# Patient Record
Sex: MALE | Race: WHITE | NOT HISPANIC OR LATINO
[De-identification: names, ages, dates, MRNs, and addresses within clinical notes are randomized per-mention and may not be internally consistent; named-entity substitution may affect disease eponyms.]

---

## 2017-01-10 ENCOUNTER — APPOINTMENT (OUTPATIENT)
Dept: HEART AND VASCULAR | Facility: CLINIC | Age: 67
End: 2017-01-10

## 2017-01-10 ENCOUNTER — LABORATORY RESULT (OUTPATIENT)
Age: 67
End: 2017-01-10

## 2017-01-10 VITALS — SYSTOLIC BLOOD PRESSURE: 150 MMHG | DIASTOLIC BLOOD PRESSURE: 92 MMHG

## 2017-01-10 VITALS
WEIGHT: 183 LBS | BODY MASS INDEX: 26.2 KG/M2 | HEART RATE: 73 BPM | SYSTOLIC BLOOD PRESSURE: 176 MMHG | DIASTOLIC BLOOD PRESSURE: 78 MMHG | HEIGHT: 70 IN

## 2017-01-10 VITALS — SYSTOLIC BLOOD PRESSURE: 162 MMHG | DIASTOLIC BLOOD PRESSURE: 78 MMHG

## 2017-01-11 LAB
ANION GAP SERPL CALC-SCNC: 18 MMOL/L
APPEARANCE: CLEAR
BACTERIA: NEGATIVE
BILIRUBIN URINE: NEGATIVE
BLOOD URINE: NEGATIVE
BUN SERPL-MCNC: 21 MG/DL
CALCIUM SERPL-MCNC: 9.7 MG/DL
CHLORIDE SERPL-SCNC: 102 MMOL/L
CHOLEST SERPL-MCNC: 217 MG/DL
CHOLEST/HDLC SERPL: 5.6 RATIO
CO2 SERPL-SCNC: 23 MMOL/L
COLOR: YELLOW
CREAT SERPL-MCNC: 1.2 MG/DL
FOLATE SERPL-MCNC: 17.4 NG/ML
GLUCOSE QUALITATIVE U: NORMAL MG/DL
GLUCOSE SERPL-MCNC: 105 MG/DL
HDLC SERPL-MCNC: 39 MG/DL
KETONES URINE: NEGATIVE
LDLC SERPL CALC-MCNC: 154 MG/DL
LEUKOCYTE ESTERASE URINE: NEGATIVE
LPL SERPL-CCNC: 43 U/L
MICROSCOPIC-UA: NORMAL
NITRITE URINE: NEGATIVE
PH URINE: 5.5
POTASSIUM SERPL-SCNC: 4.4 MMOL/L
PROTEIN URINE: NEGATIVE MG/DL
RED BLOOD CELLS URINE: 2 /HPF
SODIUM SERPL-SCNC: 143 MMOL/L
SPECIFIC GRAVITY URINE: 1.01
SQUAMOUS EPITHELIAL CELLS: 0 /HPF
TRIGL SERPL-MCNC: 120 MG/DL
URATE SERPL-MCNC: 5.8 MG/DL
UROBILINOGEN URINE: NORMAL MG/DL
VIT B12 SERPL-MCNC: 910 PG/ML
WHITE BLOOD CELLS URINE: 0 /HPF

## 2017-01-12 ENCOUNTER — FORM ENCOUNTER (OUTPATIENT)
Age: 67
End: 2017-01-12

## 2017-01-12 ENCOUNTER — OTHER (OUTPATIENT)
Age: 67
End: 2017-01-12

## 2017-01-13 ENCOUNTER — OUTPATIENT (OUTPATIENT)
Dept: OUTPATIENT SERVICES | Facility: HOSPITAL | Age: 67
LOS: 1 days | End: 2017-01-13
Payer: MEDICARE

## 2017-01-13 PROCEDURE — 93976 VASCULAR STUDY: CPT

## 2017-01-13 PROCEDURE — 93976 VASCULAR STUDY: CPT | Mod: 26

## 2017-01-13 PROCEDURE — 72070 X-RAY EXAM THORAC SPINE 2VWS: CPT

## 2017-01-13 PROCEDURE — 72100 X-RAY EXAM L-S SPINE 2/3 VWS: CPT | Mod: 26,77

## 2017-01-13 PROCEDURE — 76770 US EXAM ABDO BACK WALL COMP: CPT | Mod: 26,59

## 2017-01-13 PROCEDURE — 72100 X-RAY EXAM L-S SPINE 2/3 VWS: CPT

## 2017-01-13 PROCEDURE — 72100 X-RAY EXAM L-S SPINE 2/3 VWS: CPT | Mod: 26

## 2017-01-13 PROCEDURE — 76770 US EXAM ABDO BACK WALL COMP: CPT

## 2017-01-31 ENCOUNTER — APPOINTMENT (OUTPATIENT)
Dept: ORTHOPEDIC SURGERY | Facility: CLINIC | Age: 67
End: 2017-01-31

## 2017-01-31 VITALS
WEIGHT: 182 LBS | BODY MASS INDEX: 26.05 KG/M2 | HEIGHT: 70 IN | DIASTOLIC BLOOD PRESSURE: 88 MMHG | SYSTOLIC BLOOD PRESSURE: 140 MMHG

## 2017-01-31 DIAGNOSIS — Z82.49 FAMILY HISTORY OF ISCHEMIC HEART DISEASE AND OTHER DISEASES OF THE CIRCULATORY SYSTEM: ICD-10-CM

## 2017-01-31 DIAGNOSIS — Z78.9 OTHER SPECIFIED HEALTH STATUS: ICD-10-CM

## 2017-01-31 DIAGNOSIS — Z82.61 FAMILY HISTORY OF ARTHRITIS: ICD-10-CM

## 2017-01-31 DIAGNOSIS — Z82.2 FAMILY HISTORY OF DEAFNESS AND HEARING LOSS: ICD-10-CM

## 2017-05-09 ENCOUNTER — APPOINTMENT (OUTPATIENT)
Dept: HEART AND VASCULAR | Facility: CLINIC | Age: 67
End: 2017-05-09

## 2017-05-09 ENCOUNTER — LABORATORY RESULT (OUTPATIENT)
Age: 67
End: 2017-05-09

## 2017-05-09 VITALS — SYSTOLIC BLOOD PRESSURE: 140 MMHG | DIASTOLIC BLOOD PRESSURE: 80 MMHG | HEART RATE: 73 BPM

## 2017-05-09 VITALS — SYSTOLIC BLOOD PRESSURE: 134 MMHG | DIASTOLIC BLOOD PRESSURE: 74 MMHG

## 2017-05-09 VITALS — HEIGHT: 70 IN | WEIGHT: 177 LBS | BODY MASS INDEX: 25.34 KG/M2

## 2017-05-09 DIAGNOSIS — N42.9 DISORDER OF PROSTATE, UNSPECIFIED: ICD-10-CM

## 2017-05-10 LAB
ALBUMIN SERPL ELPH-MCNC: 4.7 G/DL
ALP BLD-CCNC: 67 U/L
ALT SERPL-CCNC: 24 U/L
ANION GAP SERPL CALC-SCNC: 17 MMOL/L
AST SERPL-CCNC: 24 U/L
BASOPHILS # BLD AUTO: 0.02 K/UL
BASOPHILS NFR BLD AUTO: 0.2 %
BILIRUB SERPL-MCNC: 0.6 MG/DL
BUN SERPL-MCNC: 15 MG/DL
CALCIUM SERPL-MCNC: 9.9 MG/DL
CHLORIDE SERPL-SCNC: 104 MMOL/L
CHOLEST SERPL-MCNC: 150 MG/DL
CHOLEST/HDLC SERPL: 4 RATIO
CO2 SERPL-SCNC: 23 MMOL/L
CREAT SERPL-MCNC: 1.14 MG/DL
EOSINOPHIL # BLD AUTO: 0.04 K/UL
EOSINOPHIL NFR BLD AUTO: 0.5 %
GLUCOSE SERPL-MCNC: 98 MG/DL
HBA1C MFR BLD HPLC: 5.4 %
HCT VFR BLD CALC: 45 %
HDLC SERPL-MCNC: 39 MG/DL
HGB BLD-MCNC: 15.4 G/DL
IMM GRANULOCYTES NFR BLD AUTO: 0.1 %
LDLC SERPL CALC-MCNC: 89 MG/DL
LYMPHOCYTES # BLD AUTO: 1.5 K/UL
LYMPHOCYTES NFR BLD AUTO: 18.5 %
MAN DIFF?: NORMAL
MCHC RBC-ENTMCNC: 31.8 PG
MCHC RBC-ENTMCNC: 34.2 GM/DL
MCV RBC AUTO: 93 FL
MONOCYTES # BLD AUTO: 0.42 K/UL
MONOCYTES NFR BLD AUTO: 5.2 %
NEUTROPHILS # BLD AUTO: 6.11 K/UL
NEUTROPHILS NFR BLD AUTO: 75.5 %
PLATELET # BLD AUTO: 197 K/UL
POTASSIUM SERPL-SCNC: 4.9 MMOL/L
PROT SERPL-MCNC: 7.4 G/DL
RBC # BLD: 4.84 M/UL
RBC # FLD: 13.6 %
SODIUM SERPL-SCNC: 144 MMOL/L
T3FREE SERPL-MCNC: 2.94 PG/ML
T4 FREE SERPL-MCNC: 1.2 NG/DL
TRIGL SERPL-MCNC: 108 MG/DL
TSH SERPL-ACNC: 4.04 UIU/ML
WBC # FLD AUTO: 8.1 K/UL

## 2017-06-10 ENCOUNTER — EMERGENCY (EMERGENCY)
Facility: HOSPITAL | Age: 67
LOS: 1 days | Discharge: HOME | End: 2017-06-10
Attending: EMERGENCY MEDICINE | Admitting: EMERGENCY MEDICINE
Payer: MEDICARE

## 2017-06-10 VITALS
SYSTOLIC BLOOD PRESSURE: 134 MMHG | TEMPERATURE: 98 F | RESPIRATION RATE: 18 BRPM | OXYGEN SATURATION: 99 % | DIASTOLIC BLOOD PRESSURE: 75 MMHG | HEART RATE: 60 BPM

## 2017-06-10 VITALS
OXYGEN SATURATION: 100 % | TEMPERATURE: 98 F | WEIGHT: 177.03 LBS | HEART RATE: 76 BPM | SYSTOLIC BLOOD PRESSURE: 154 MMHG | RESPIRATION RATE: 17 BRPM | DIASTOLIC BLOOD PRESSURE: 88 MMHG

## 2017-06-10 DIAGNOSIS — Z79.899 OTHER LONG TERM (CURRENT) DRUG THERAPY: ICD-10-CM

## 2017-06-10 DIAGNOSIS — E78.00 PURE HYPERCHOLESTEROLEMIA, UNSPECIFIED: ICD-10-CM

## 2017-06-10 DIAGNOSIS — I10 ESSENTIAL (PRIMARY) HYPERTENSION: ICD-10-CM

## 2017-06-10 DIAGNOSIS — R07.89 OTHER CHEST PAIN: ICD-10-CM

## 2017-06-10 DIAGNOSIS — E03.9 HYPOTHYROIDISM, UNSPECIFIED: ICD-10-CM

## 2017-06-10 LAB
ALBUMIN SERPL ELPH-MCNC: 4.3 G/DL — SIGNIFICANT CHANGE UP (ref 3.3–5)
ALP SERPL-CCNC: 56 U/L — SIGNIFICANT CHANGE UP (ref 40–120)
ALT FLD-CCNC: 22 U/L — SIGNIFICANT CHANGE UP (ref 10–45)
ANION GAP SERPL CALC-SCNC: 12 MMOL/L — SIGNIFICANT CHANGE UP (ref 5–17)
AST SERPL-CCNC: 34 U/L — SIGNIFICANT CHANGE UP (ref 10–40)
BASOPHILS NFR BLD AUTO: 0.2 % — SIGNIFICANT CHANGE UP (ref 0–2)
BILIRUB SERPL-MCNC: 0.5 MG/DL — SIGNIFICANT CHANGE UP (ref 0.2–1.2)
BUN SERPL-MCNC: 23 MG/DL — SIGNIFICANT CHANGE UP (ref 7–23)
CALCIUM SERPL-MCNC: 9.3 MG/DL — SIGNIFICANT CHANGE UP (ref 8.4–10.5)
CHLORIDE SERPL-SCNC: 104 MMOL/L — SIGNIFICANT CHANGE UP (ref 96–108)
CK MB CFR SERPL CALC: 2.1 NG/ML — SIGNIFICANT CHANGE UP (ref 0–6.7)
CK SERPL-CCNC: 55 U/L — SIGNIFICANT CHANGE UP (ref 30–200)
CO2 SERPL-SCNC: 24 MMOL/L — SIGNIFICANT CHANGE UP (ref 22–31)
CREAT SERPL-MCNC: 1 MG/DL — SIGNIFICANT CHANGE UP (ref 0.5–1.3)
EOSINOPHIL NFR BLD AUTO: 0.7 % — SIGNIFICANT CHANGE UP (ref 0–6)
GLUCOSE SERPL-MCNC: 113 MG/DL — HIGH (ref 70–99)
HCT VFR BLD CALC: 42.8 % — SIGNIFICANT CHANGE UP (ref 39–50)
HGB BLD-MCNC: 15.1 G/DL — SIGNIFICANT CHANGE UP (ref 13–17)
LYMPHOCYTES # BLD AUTO: 28.5 % — SIGNIFICANT CHANGE UP (ref 13–44)
MCHC RBC-ENTMCNC: 30.9 PG — SIGNIFICANT CHANGE UP (ref 27–34)
MCHC RBC-ENTMCNC: 35.3 G/DL — SIGNIFICANT CHANGE UP (ref 32–36)
MCV RBC AUTO: 87.7 FL — SIGNIFICANT CHANGE UP (ref 80–100)
MONOCYTES NFR BLD AUTO: 8.1 % — SIGNIFICANT CHANGE UP (ref 2–14)
NEUTROPHILS NFR BLD AUTO: 62.5 % — SIGNIFICANT CHANGE UP (ref 43–77)
NT-PROBNP SERPL-SCNC: 7 PG/ML — SIGNIFICANT CHANGE UP (ref 0–300)
PLATELET # BLD AUTO: 161 K/UL — SIGNIFICANT CHANGE UP (ref 150–400)
POTASSIUM SERPL-MCNC: 5 MMOL/L — SIGNIFICANT CHANGE UP (ref 3.5–5.3)
POTASSIUM SERPL-SCNC: 5 MMOL/L — SIGNIFICANT CHANGE UP (ref 3.5–5.3)
PROT SERPL-MCNC: 7.5 G/DL — SIGNIFICANT CHANGE UP (ref 6–8.3)
RBC # BLD: 4.88 M/UL — SIGNIFICANT CHANGE UP (ref 4.2–5.8)
RBC # FLD: 12.8 % — SIGNIFICANT CHANGE UP (ref 10.3–16.9)
SODIUM SERPL-SCNC: 140 MMOL/L — SIGNIFICANT CHANGE UP (ref 135–145)
TROPONIN T SERPL-MCNC: <0.01 NG/ML — SIGNIFICANT CHANGE UP (ref 0–0.01)
TROPONIN T SERPL-MCNC: <0.01 NG/ML — SIGNIFICANT CHANGE UP (ref 0–0.01)
WBC # BLD: 8.9 K/UL — SIGNIFICANT CHANGE UP (ref 3.8–10.5)
WBC # FLD AUTO: 8.9 K/UL — SIGNIFICANT CHANGE UP (ref 3.8–10.5)

## 2017-06-10 PROCEDURE — 99285 EMERGENCY DEPT VISIT HI MDM: CPT | Mod: 25

## 2017-06-10 PROCEDURE — 80053 COMPREHEN METABOLIC PANEL: CPT

## 2017-06-10 PROCEDURE — 93010 ELECTROCARDIOGRAM REPORT: CPT

## 2017-06-10 PROCEDURE — 82553 CREATINE MB FRACTION: CPT

## 2017-06-10 PROCEDURE — 99284 EMERGENCY DEPT VISIT MOD MDM: CPT | Mod: 25

## 2017-06-10 PROCEDURE — 85610 PROTHROMBIN TIME: CPT

## 2017-06-10 PROCEDURE — 85730 THROMBOPLASTIN TIME PARTIAL: CPT

## 2017-06-10 PROCEDURE — 71010: CPT | Mod: 26

## 2017-06-10 PROCEDURE — 93005 ELECTROCARDIOGRAM TRACING: CPT

## 2017-06-10 PROCEDURE — 84484 ASSAY OF TROPONIN QUANT: CPT

## 2017-06-10 PROCEDURE — 36415 COLL VENOUS BLD VENIPUNCTURE: CPT

## 2017-06-10 PROCEDURE — 85025 COMPLETE CBC W/AUTO DIFF WBC: CPT

## 2017-06-10 PROCEDURE — 71045 X-RAY EXAM CHEST 1 VIEW: CPT

## 2017-06-10 PROCEDURE — 96374 THER/PROPH/DIAG INJ IV PUSH: CPT

## 2017-06-10 PROCEDURE — 83880 ASSAY OF NATRIURETIC PEPTIDE: CPT

## 2017-06-10 PROCEDURE — 82550 ASSAY OF CK (CPK): CPT

## 2017-06-10 RX ORDER — FAMOTIDINE 10 MG/ML
20 INJECTION INTRAVENOUS ONCE
Qty: 0 | Refills: 0 | Status: COMPLETED | OUTPATIENT
Start: 2017-06-10 | End: 2017-06-10

## 2017-06-10 RX ADMIN — FAMOTIDINE 20 MILLIGRAM(S): 10 INJECTION INTRAVENOUS at 10:53

## 2017-06-10 NOTE — ED PROVIDER NOTE - MEDICAL DECISION MAKING DETAILS
pt with cp, hx of htn and high cholesterol normal stress one month prior, ekg and trop x 2 neg, hx of reflux, symptoms relieved by pepcid most likely GI cause of pain but pt encouraged to follow up with Dr. Calix on Monday.  Attempted to call but unable to contact.

## 2017-06-10 NOTE — ED ADULT TRIAGE NOTE - CHIEF COMPLAINT QUOTE
constant midsternal, nonradiating pressure like chest pain that started 45 mins ago [place].  Sent in by Dr. Calix.  Hx: HTN, HLD

## 2017-06-10 NOTE — ED PROVIDER NOTE - OBJECTIVE STATEMENT
68 y/o m with PMH of HTN, gout, hypothyroid, HLD, GERD presents to ED with midsternal chest discomfort starting while walking quickly.  Pain started at 9:30 lasted for 45 minutes and was concerned although symptoms felt similar to GERD.  Denies radiation of pain or other associated symptoms such as dyspnea, nausea, diaphoresis.  Cards: Laurence Calix - had normal stress test and echo 5/17.

## 2017-06-10 NOTE — ED ADULT NURSE NOTE - CHPI ED SYMPTOMS NEG
no shortness of breath/no cough/no diaphoresis/no dizziness/HA, visual changes, numbness or tingling to extremities, diarrhea, productive cough, leg swelling, hx of recent travel./no vomiting/no nausea/no syncope/no fever/no back pain/no chills

## 2017-06-10 NOTE — ED ADULT NURSE REASSESSMENT NOTE - NS ED NURSE REASSESS COMMENT FT1
Patient is A&O x3 and +PERRLA. Patient is NAD, speaking full sentences, chest rise is equal and symmetrical bilaterally, trachea midline and lung sounds are clear in upper/lower bases bilaterally. Abdomen is SNT upon palpation, +bowel sounds in all 4 quadrants, no pulsatile mass noted. No edema noted in lower extremities bilaterally, cap refill less than 3 seconds bilaterally. Patient resting comfortably and aware with plan of care, will continue to monitor.
Lab and x-ray results reviewed, patient awaiting 2nd lab draw for cardiac enzymes. Patient aware with plan of care, will continue to monitor.
Repeat lab results reviewed, patient awaiting discharge and aware with plan of care, will continue to monitor.

## 2017-06-10 NOTE — ED ADULT NURSE NOTE - OBJECTIVE STATEMENT
Patient comes in ambulatory into bed # 6 complaining of constant burning/sharp 4/10, non radiating mid sternal chest pain that started around 09:30AM today [place] the train. Patient reports has had similar symptoms but was reflux in the past. Patient has not been taking reflux medications and no prior tx to arrival.

## 2017-09-25 ENCOUNTER — APPOINTMENT (OUTPATIENT)
Dept: HEART AND VASCULAR | Facility: CLINIC | Age: 67
End: 2017-09-25
Payer: MEDICARE

## 2017-09-25 VITALS
HEART RATE: 64 BPM | HEIGHT: 70 IN | SYSTOLIC BLOOD PRESSURE: 160 MMHG | WEIGHT: 177 LBS | DIASTOLIC BLOOD PRESSURE: 74 MMHG | BODY MASS INDEX: 25.34 KG/M2

## 2017-09-25 VITALS — DIASTOLIC BLOOD PRESSURE: 74 MMHG | SYSTOLIC BLOOD PRESSURE: 142 MMHG

## 2017-09-25 VITALS — TEMPERATURE: 98.3 F

## 2017-09-25 PROCEDURE — 36415 COLL VENOUS BLD VENIPUNCTURE: CPT

## 2017-09-25 PROCEDURE — 93000 ELECTROCARDIOGRAM COMPLETE: CPT

## 2017-09-25 PROCEDURE — 99214 OFFICE O/P EST MOD 30 MIN: CPT | Mod: 25

## 2017-09-27 LAB
ALBUMIN SERPL ELPH-MCNC: 4.6 G/DL
ALP BLD-CCNC: 61 U/L
ALT SERPL-CCNC: 24 U/L
ANION GAP SERPL CALC-SCNC: 18 MMOL/L
APPEARANCE: CLEAR
APPEARANCE: CLEAR
AST SERPL-CCNC: 19 U/L
BACTERIA: NEGATIVE
BASOPHILS # BLD AUTO: 0.02 K/UL
BASOPHILS NFR BLD AUTO: 0.3 %
BILIRUB SERPL-MCNC: 0.8 MG/DL
BILIRUBIN URINE: NEGATIVE
BILIRUBIN URINE: NEGATIVE
BLOOD URINE: NEGATIVE
BLOOD URINE: NEGATIVE
BUN SERPL-MCNC: 17 MG/DL
CALCIUM SERPL-MCNC: 9.8 MG/DL
CHLORIDE SERPL-SCNC: 104 MMOL/L
CHOLEST SERPL-MCNC: 155 MG/DL
CHOLEST/HDLC SERPL: 4 RATIO
CO2 SERPL-SCNC: 23 MMOL/L
COLOR: YELLOW
COLOR: YELLOW
CREAT SERPL-MCNC: 1.19 MG/DL
EOSINOPHIL # BLD AUTO: 0.08 K/UL
EOSINOPHIL NFR BLD AUTO: 1 %
GLUCOSE QUALITATIVE U: NORMAL MG/DL
GLUCOSE QUALITATIVE U: NORMAL MG/DL
GLUCOSE SERPL-MCNC: 97 MG/DL
HBA1C MFR BLD HPLC: 5.4 %
HCT VFR BLD CALC: 40.9 %
HDLC SERPL-MCNC: 39 MG/DL
HGB BLD-MCNC: 14 G/DL
HYALINE CASTS: 1 /LPF
IMM GRANULOCYTES NFR BLD AUTO: 0.1 %
KETONES URINE: NEGATIVE
KETONES URINE: NEGATIVE
LDLC SERPL CALC-MCNC: 95 MG/DL
LEUKOCYTE ESTERASE URINE: NEGATIVE
LEUKOCYTE ESTERASE URINE: NEGATIVE
LYMPHOCYTES # BLD AUTO: 2.14 K/UL
LYMPHOCYTES NFR BLD AUTO: 27.4 %
MAN DIFF?: NORMAL
MCHC RBC-ENTMCNC: 31.1 PG
MCHC RBC-ENTMCNC: 34.2 GM/DL
MCV RBC AUTO: 90.9 FL
MICROSCOPIC-UA: NORMAL
MONOCYTES # BLD AUTO: 0.52 K/UL
MONOCYTES NFR BLD AUTO: 6.7 %
NEUTROPHILS # BLD AUTO: 5.03 K/UL
NEUTROPHILS NFR BLD AUTO: 64.5 %
NITRITE URINE: NEGATIVE
NITRITE URINE: NEGATIVE
PH URINE: 5
PH URINE: 5
PLATELET # BLD AUTO: 181 K/UL
POTASSIUM SERPL-SCNC: 4.5 MMOL/L
PROT SERPL-MCNC: 7 G/DL
PROTEIN URINE: NEGATIVE MG/DL
PROTEIN URINE: NEGATIVE MG/DL
PSA SERPL-MCNC: 3.52 NG/ML
RBC # BLD: 4.5 M/UL
RBC # FLD: 13.4 %
RED BLOOD CELLS URINE: 6 /HPF
SODIUM SERPL-SCNC: 145 MMOL/L
SPECIFIC GRAVITY URINE: 1.02
SPECIFIC GRAVITY URINE: 1.02
SQUAMOUS EPITHELIAL CELLS: 1 /HPF
T4 FREE SERPL-MCNC: 1.1 NG/DL
TRIGL SERPL-MCNC: 104 MG/DL
TSH SERPL-ACNC: 5.46 UIU/ML
UROBILINOGEN URINE: NORMAL MG/DL
UROBILINOGEN URINE: NORMAL MG/DL
WBC # FLD AUTO: 7.8 K/UL
WHITE BLOOD CELLS URINE: 0 /HPF

## 2017-11-19 ENCOUNTER — FORM ENCOUNTER (OUTPATIENT)
Age: 67
End: 2017-11-19

## 2017-11-20 ENCOUNTER — APPOINTMENT (OUTPATIENT)
Dept: ORTHOPEDIC SURGERY | Facility: CLINIC | Age: 67
End: 2017-11-20
Payer: MEDICARE

## 2017-11-20 ENCOUNTER — OUTPATIENT (OUTPATIENT)
Dept: OUTPATIENT SERVICES | Facility: HOSPITAL | Age: 67
LOS: 1 days | End: 2017-11-20
Payer: MEDICARE

## 2017-11-20 VITALS — WEIGHT: 177 LBS | HEIGHT: 70 IN | BODY MASS INDEX: 25.34 KG/M2

## 2017-11-20 PROCEDURE — 99214 OFFICE O/P EST MOD 30 MIN: CPT

## 2017-11-20 PROCEDURE — 73501 X-RAY EXAM HIP UNI 1 VIEW: CPT

## 2017-11-20 PROCEDURE — 73501 X-RAY EXAM HIP UNI 1 VIEW: CPT | Mod: 26,LT

## 2018-01-09 ENCOUNTER — APPOINTMENT (OUTPATIENT)
Dept: HEART AND VASCULAR | Facility: CLINIC | Age: 68
End: 2018-01-09
Payer: MEDICARE

## 2018-01-09 VITALS
BODY MASS INDEX: 25.54 KG/M2 | TEMPERATURE: 98.3 F | DIASTOLIC BLOOD PRESSURE: 82 MMHG | WEIGHT: 178 LBS | HEART RATE: 89 BPM | SYSTOLIC BLOOD PRESSURE: 138 MMHG

## 2018-01-09 PROCEDURE — 99213 OFFICE O/P EST LOW 20 MIN: CPT

## 2018-01-09 RX ORDER — HYDROCORTISONE 2.5% 25 MG/G
2.5 CREAM TOPICAL
Qty: 30 | Refills: 0 | Status: DISCONTINUED | COMMUNITY
Start: 2017-12-13

## 2018-03-23 ENCOUNTER — APPOINTMENT (OUTPATIENT)
Dept: HEART AND VASCULAR | Facility: CLINIC | Age: 68
End: 2018-03-23
Payer: MEDICARE

## 2018-03-23 VITALS
HEIGHT: 70 IN | HEART RATE: 67 BPM | WEIGHT: 179 LBS | DIASTOLIC BLOOD PRESSURE: 80 MMHG | SYSTOLIC BLOOD PRESSURE: 110 MMHG | BODY MASS INDEX: 25.62 KG/M2

## 2018-03-23 VITALS — BODY MASS INDEX: 25.4 KG/M2 | WEIGHT: 177 LBS

## 2018-03-23 VITALS — SYSTOLIC BLOOD PRESSURE: 124 MMHG | DIASTOLIC BLOOD PRESSURE: 82 MMHG

## 2018-03-23 PROCEDURE — 36415 COLL VENOUS BLD VENIPUNCTURE: CPT

## 2018-03-23 PROCEDURE — 93000 ELECTROCARDIOGRAM COMPLETE: CPT

## 2018-03-23 PROCEDURE — 99214 OFFICE O/P EST MOD 30 MIN: CPT | Mod: 25

## 2018-03-23 PROCEDURE — 93306 TTE W/DOPPLER COMPLETE: CPT

## 2018-03-25 LAB
ANION GAP SERPL CALC-SCNC: 15 MMOL/L
BUN SERPL-MCNC: 18 MG/DL
CALCIUM SERPL-MCNC: 9.9 MG/DL
CHLORIDE SERPL-SCNC: 102 MMOL/L
CHOLEST SERPL-MCNC: 150 MG/DL
CHOLEST/HDLC SERPL: 4.1 RATIO
CO2 SERPL-SCNC: 25 MMOL/L
CREAT SERPL-MCNC: 1.12 MG/DL
GLUCOSE SERPL-MCNC: 111 MG/DL
HBA1C MFR BLD HPLC: 5.5 %
HDLC SERPL-MCNC: 37 MG/DL
LDLC SERPL CALC-MCNC: 89 MG/DL
POTASSIUM SERPL-SCNC: 4.7 MMOL/L
SODIUM SERPL-SCNC: 142 MMOL/L
TRIGL SERPL-MCNC: 118 MG/DL
URATE SERPL-MCNC: 5.8 MG/DL

## 2018-05-04 ENCOUNTER — APPOINTMENT (OUTPATIENT)
Dept: HEART AND VASCULAR | Facility: CLINIC | Age: 68
End: 2018-05-04
Payer: MEDICARE

## 2018-05-04 VITALS
SYSTOLIC BLOOD PRESSURE: 140 MMHG | TEMPERATURE: 98.5 F | HEIGHT: 70 IN | HEART RATE: 80 BPM | DIASTOLIC BLOOD PRESSURE: 70 MMHG | BODY MASS INDEX: 24.91 KG/M2 | WEIGHT: 174 LBS

## 2018-05-04 PROCEDURE — 99213 OFFICE O/P EST LOW 20 MIN: CPT | Mod: 25

## 2018-05-04 PROCEDURE — 87880 STREP A ASSAY W/OPTIC: CPT | Mod: QW

## 2018-05-04 RX ORDER — ALLOPURINOL 300 MG/1
300 TABLET ORAL
Qty: 90 | Refills: 0 | Status: DISCONTINUED | COMMUNITY
Start: 2018-02-09

## 2018-07-22 ENCOUNTER — EMERGENCY (EMERGENCY)
Facility: HOSPITAL | Age: 68
LOS: 1 days | Discharge: HOME | End: 2018-07-22
Attending: EMERGENCY MEDICINE | Admitting: EMERGENCY MEDICINE
Payer: MEDICARE

## 2018-07-22 VITALS
HEART RATE: 60 BPM | TEMPERATURE: 99 F | OXYGEN SATURATION: 97 % | SYSTOLIC BLOOD PRESSURE: 142 MMHG | DIASTOLIC BLOOD PRESSURE: 90 MMHG | RESPIRATION RATE: 18 BRPM

## 2018-07-22 VITALS
DIASTOLIC BLOOD PRESSURE: 81 MMHG | HEIGHT: 70 IN | SYSTOLIC BLOOD PRESSURE: 168 MMHG | OXYGEN SATURATION: 98 % | TEMPERATURE: 98 F | HEART RATE: 75 BPM | WEIGHT: 175.93 LBS | RESPIRATION RATE: 18 BRPM

## 2018-07-22 DIAGNOSIS — E78.5 HYPERLIPIDEMIA, UNSPECIFIED: ICD-10-CM

## 2018-07-22 DIAGNOSIS — Z79.899 OTHER LONG TERM (CURRENT) DRUG THERAPY: ICD-10-CM

## 2018-07-22 DIAGNOSIS — R10.32 LEFT LOWER QUADRANT PAIN: ICD-10-CM

## 2018-07-22 DIAGNOSIS — I10 ESSENTIAL (PRIMARY) HYPERTENSION: ICD-10-CM

## 2018-07-22 PROBLEM — E03.9 HYPOTHYROIDISM, UNSPECIFIED: Chronic | Status: ACTIVE | Noted: 2017-06-10

## 2018-07-22 PROBLEM — M10.9 GOUT, UNSPECIFIED: Chronic | Status: ACTIVE | Noted: 2017-06-10

## 2018-07-22 PROBLEM — E78.00 PURE HYPERCHOLESTEROLEMIA, UNSPECIFIED: Chronic | Status: ACTIVE | Noted: 2017-06-10

## 2018-07-22 LAB
ALBUMIN SERPL ELPH-MCNC: 4.4 G/DL — SIGNIFICANT CHANGE UP (ref 3.3–5)
ALP SERPL-CCNC: 56 U/L — SIGNIFICANT CHANGE UP (ref 40–120)
ALT FLD-CCNC: 25 U/L — SIGNIFICANT CHANGE UP (ref 10–45)
ANION GAP SERPL CALC-SCNC: 11 MMOL/L — SIGNIFICANT CHANGE UP (ref 5–17)
APPEARANCE UR: CLEAR — SIGNIFICANT CHANGE UP
AST SERPL-CCNC: 22 U/L — SIGNIFICANT CHANGE UP (ref 10–40)
BASOPHILS NFR BLD AUTO: 0.1 % — SIGNIFICANT CHANGE UP (ref 0–2)
BILIRUB SERPL-MCNC: 0.6 MG/DL — SIGNIFICANT CHANGE UP (ref 0.2–1.2)
BILIRUB UR-MCNC: NEGATIVE — SIGNIFICANT CHANGE UP
BUN SERPL-MCNC: 22 MG/DL — SIGNIFICANT CHANGE UP (ref 7–23)
CALCIUM SERPL-MCNC: 9.4 MG/DL — SIGNIFICANT CHANGE UP (ref 8.4–10.5)
CHLORIDE SERPL-SCNC: 100 MMOL/L — SIGNIFICANT CHANGE UP (ref 96–108)
CO2 SERPL-SCNC: 25 MMOL/L — SIGNIFICANT CHANGE UP (ref 22–31)
COLOR SPEC: YELLOW — SIGNIFICANT CHANGE UP
CREAT SERPL-MCNC: 1.05 MG/DL — SIGNIFICANT CHANGE UP (ref 0.5–1.3)
DIFF PNL FLD: NEGATIVE — SIGNIFICANT CHANGE UP
EOSINOPHIL NFR BLD AUTO: 0.2 % — SIGNIFICANT CHANGE UP (ref 0–6)
GLUCOSE SERPL-MCNC: 121 MG/DL — HIGH (ref 70–99)
GLUCOSE UR QL: NEGATIVE — SIGNIFICANT CHANGE UP
HCT VFR BLD CALC: 42.5 % — SIGNIFICANT CHANGE UP (ref 39–50)
HGB BLD-MCNC: 14.6 G/DL — SIGNIFICANT CHANGE UP (ref 13–17)
KETONES UR-MCNC: NEGATIVE — SIGNIFICANT CHANGE UP
LEUKOCYTE ESTERASE UR-ACNC: NEGATIVE — SIGNIFICANT CHANGE UP
LYMPHOCYTES # BLD AUTO: 14.1 % — SIGNIFICANT CHANGE UP (ref 13–44)
MCHC RBC-ENTMCNC: 30.4 PG — SIGNIFICANT CHANGE UP (ref 27–34)
MCHC RBC-ENTMCNC: 34.4 G/DL — SIGNIFICANT CHANGE UP (ref 32–36)
MCV RBC AUTO: 88.5 FL — SIGNIFICANT CHANGE UP (ref 80–100)
MONOCYTES NFR BLD AUTO: 5.5 % — SIGNIFICANT CHANGE UP (ref 2–14)
NEUTROPHILS NFR BLD AUTO: 80.1 % — HIGH (ref 43–77)
NITRITE UR-MCNC: NEGATIVE — SIGNIFICANT CHANGE UP
PH UR: 5 — SIGNIFICANT CHANGE UP (ref 5–8)
PLATELET # BLD AUTO: 168 K/UL — SIGNIFICANT CHANGE UP (ref 150–400)
POTASSIUM SERPL-MCNC: 4.4 MMOL/L — SIGNIFICANT CHANGE UP (ref 3.5–5.3)
POTASSIUM SERPL-SCNC: 4.4 MMOL/L — SIGNIFICANT CHANGE UP (ref 3.5–5.3)
PROT SERPL-MCNC: 7.4 G/DL — SIGNIFICANT CHANGE UP (ref 6–8.3)
PROT UR-MCNC: NEGATIVE MG/DL — SIGNIFICANT CHANGE UP
RBC # BLD: 4.8 M/UL — SIGNIFICANT CHANGE UP (ref 4.2–5.8)
RBC # FLD: 12.6 % — SIGNIFICANT CHANGE UP (ref 10.3–16.9)
SODIUM SERPL-SCNC: 136 MMOL/L — SIGNIFICANT CHANGE UP (ref 135–145)
SP GR SPEC: <=1.005 — SIGNIFICANT CHANGE UP (ref 1–1.03)
UROBILINOGEN FLD QL: 0.2 E.U./DL — SIGNIFICANT CHANGE UP
WBC # BLD: 8 K/UL — SIGNIFICANT CHANGE UP (ref 3.8–10.5)
WBC # FLD AUTO: 8 K/UL — SIGNIFICANT CHANGE UP (ref 3.8–10.5)

## 2018-07-22 PROCEDURE — 87086 URINE CULTURE/COLONY COUNT: CPT

## 2018-07-22 PROCEDURE — 74176 CT ABD & PELVIS W/O CONTRAST: CPT

## 2018-07-22 PROCEDURE — 74176 CT ABD & PELVIS W/O CONTRAST: CPT | Mod: 26

## 2018-07-22 PROCEDURE — 80053 COMPREHEN METABOLIC PANEL: CPT

## 2018-07-22 PROCEDURE — 81003 URINALYSIS AUTO W/O SCOPE: CPT

## 2018-07-22 PROCEDURE — 99284 EMERGENCY DEPT VISIT MOD MDM: CPT

## 2018-07-22 PROCEDURE — 99284 EMERGENCY DEPT VISIT MOD MDM: CPT | Mod: 25

## 2018-07-22 PROCEDURE — 96374 THER/PROPH/DIAG INJ IV PUSH: CPT

## 2018-07-22 PROCEDURE — 85025 COMPLETE CBC W/AUTO DIFF WBC: CPT

## 2018-07-22 PROCEDURE — 83690 ASSAY OF LIPASE: CPT

## 2018-07-22 PROCEDURE — 36415 COLL VENOUS BLD VENIPUNCTURE: CPT

## 2018-07-22 RX ORDER — LEVOTHYROXINE SODIUM 125 MCG
1 TABLET ORAL
Qty: 0 | Refills: 0 | COMMUNITY

## 2018-07-22 RX ORDER — SODIUM CHLORIDE 9 MG/ML
1000 INJECTION INTRAMUSCULAR; INTRAVENOUS; SUBCUTANEOUS ONCE
Qty: 0 | Refills: 0 | Status: COMPLETED | OUTPATIENT
Start: 2018-07-22 | End: 2018-07-22

## 2018-07-22 RX ORDER — ALLOPURINOL 300 MG
1 TABLET ORAL
Qty: 0 | Refills: 0 | COMMUNITY

## 2018-07-22 RX ORDER — ATORVASTATIN CALCIUM 80 MG/1
1 TABLET, FILM COATED ORAL
Qty: 0 | Refills: 0 | COMMUNITY

## 2018-07-22 RX ORDER — DUTASTERIDE 0.5 MG/1
1 CAPSULE, LIQUID FILLED ORAL
Qty: 0 | Refills: 0 | COMMUNITY

## 2018-07-22 RX ORDER — KETOROLAC TROMETHAMINE 30 MG/ML
30 SYRINGE (ML) INJECTION ONCE
Qty: 0 | Refills: 0 | Status: DISCONTINUED | OUTPATIENT
Start: 2018-07-22 | End: 2018-07-22

## 2018-07-22 RX ORDER — LISINOPRIL 2.5 MG/1
1 TABLET ORAL
Qty: 0 | Refills: 0 | COMMUNITY

## 2018-07-22 RX ORDER — POTASSIUM CITRATE MONOHYDRATE 100 %
1 POWDER (GRAM) MISCELLANEOUS
Qty: 0 | Refills: 0 | COMMUNITY

## 2018-07-22 RX ADMIN — SODIUM CHLORIDE 1000 MILLILITER(S): 9 INJECTION INTRAMUSCULAR; INTRAVENOUS; SUBCUTANEOUS at 10:48

## 2018-07-22 RX ADMIN — Medication 30 MILLIGRAM(S): at 12:36

## 2018-07-22 RX ADMIN — Medication 30 MILLIGRAM(S): at 13:00

## 2018-07-22 NOTE — ED PROVIDER NOTE - MEDICAL DECISION MAKING DETAILS
pt with left lower flank pain, hx of kidney stones, abd soft nt, workup with ? pancreatitis inflammation neg lipase, u/a neg, reactive LNS no other source of pathology - pt feels better with toradol, will repeat bp and follow up with pmd as outpt

## 2018-07-22 NOTE — ED PROVIDER NOTE - GASTROINTESTINAL, MLM
Abdomen soft, non-tender, no guarding, no cva tendenress, no testicular swelling, no palpable hernia

## 2018-07-22 NOTE — ED PROVIDER NOTE - OBJECTIVE STATEMENT
67 yo m with hx of kidney stones, HTN, HLD, gout presents with left lower flank pain x 1 day.  Denies hematuria, nausea, vomiting.  No testicular swelling or pain.  Hx of hernia repair and cholecystectomy.

## 2018-07-22 NOTE — ED ADULT NURSE NOTE - CHPI ED SYMPTOMS NEG
no tingling/no anorexia/no constipation/no bladder dysfunction/no bowel dysfunction/no fatigue/no neck tenderness/no numbness/no difficulty bearing weight/no motor function loss

## 2018-07-23 LAB
CULTURE RESULTS: NO GROWTH — SIGNIFICANT CHANGE UP
SPECIMEN SOURCE: SIGNIFICANT CHANGE UP

## 2018-08-20 ENCOUNTER — APPOINTMENT (OUTPATIENT)
Dept: HEART AND VASCULAR | Facility: CLINIC | Age: 68
End: 2018-08-20
Payer: MEDICARE

## 2018-08-20 VITALS
WEIGHT: 178 LBS | HEIGHT: 70 IN | HEART RATE: 78 BPM | SYSTOLIC BLOOD PRESSURE: 142 MMHG | BODY MASS INDEX: 25.48 KG/M2 | DIASTOLIC BLOOD PRESSURE: 78 MMHG

## 2018-08-20 VITALS — TEMPERATURE: 99.1 F

## 2018-08-20 VITALS — SYSTOLIC BLOOD PRESSURE: 140 MMHG | DIASTOLIC BLOOD PRESSURE: 72 MMHG

## 2018-08-20 PROCEDURE — 99213 OFFICE O/P EST LOW 20 MIN: CPT

## 2018-09-05 ENCOUNTER — FORM ENCOUNTER (OUTPATIENT)
Age: 68
End: 2018-09-05

## 2018-09-06 ENCOUNTER — OUTPATIENT (OUTPATIENT)
Dept: OUTPATIENT SERVICES | Facility: HOSPITAL | Age: 68
LOS: 1 days | End: 2018-09-06
Payer: MEDICARE

## 2018-09-06 ENCOUNTER — APPOINTMENT (OUTPATIENT)
Dept: HEART AND VASCULAR | Facility: CLINIC | Age: 68
End: 2018-09-06
Payer: MEDICARE

## 2018-09-06 VITALS
SYSTOLIC BLOOD PRESSURE: 140 MMHG | DIASTOLIC BLOOD PRESSURE: 80 MMHG | BODY MASS INDEX: 24.82 KG/M2 | HEART RATE: 72 BPM | WEIGHT: 173 LBS

## 2018-09-06 VITALS — TEMPERATURE: 98.3 F

## 2018-09-06 DIAGNOSIS — Z00.00 ENCOUNTER FOR GENERAL ADULT MEDICAL EXAMINATION W/OUT ABNORMAL FINDINGS: ICD-10-CM

## 2018-09-06 PROCEDURE — 71046 X-RAY EXAM CHEST 2 VIEWS: CPT

## 2018-09-06 PROCEDURE — 93000 ELECTROCARDIOGRAM COMPLETE: CPT

## 2018-09-06 PROCEDURE — 99214 OFFICE O/P EST MOD 30 MIN: CPT | Mod: 25

## 2018-09-06 PROCEDURE — 36415 COLL VENOUS BLD VENIPUNCTURE: CPT

## 2018-09-06 PROCEDURE — 71046 X-RAY EXAM CHEST 2 VIEWS: CPT | Mod: 26

## 2018-09-06 RX ORDER — AZITHROMYCIN 500 MG/1
500 TABLET, FILM COATED ORAL DAILY
Qty: 3 | Refills: 0 | Status: DISCONTINUED | COMMUNITY
Start: 2018-08-20 | End: 2018-09-06

## 2018-09-07 LAB
ALBUMIN SERPL ELPH-MCNC: 4.5 G/DL
ALP BLD-CCNC: 59 U/L
ALT SERPL-CCNC: 27 U/L
ANION GAP SERPL CALC-SCNC: 13 MMOL/L
AST SERPL-CCNC: 23 U/L
BASOPHILS # BLD AUTO: 0.02 K/UL
BASOPHILS NFR BLD AUTO: 0.2 %
BILIRUB SERPL-MCNC: 0.4 MG/DL
BUN SERPL-MCNC: 21 MG/DL
CALCIUM SERPL-MCNC: 10.1 MG/DL
CHLORIDE SERPL-SCNC: 103 MMOL/L
CHOLEST SERPL-MCNC: 148 MG/DL
CHOLEST/HDLC SERPL: 3.7 RATIO
CO2 SERPL-SCNC: 28 MMOL/L
CREAT SERPL-MCNC: 1.15 MG/DL
EOSINOPHIL # BLD AUTO: 0.1 K/UL
EOSINOPHIL NFR BLD AUTO: 1.2 %
FOLATE SERPL-MCNC: >20 NG/ML
GLUCOSE SERPL-MCNC: 109 MG/DL
HBA1C MFR BLD HPLC: 5.5 %
HCT VFR BLD CALC: 46 %
HDLC SERPL-MCNC: 40 MG/DL
HGB BLD-MCNC: 15.5 G/DL
IMM GRANULOCYTES NFR BLD AUTO: 0.2 %
LDLC SERPL CALC-MCNC: 94 MG/DL
LYMPHOCYTES # BLD AUTO: 2.09 K/UL
LYMPHOCYTES NFR BLD AUTO: 25.4 %
MAN DIFF?: NORMAL
MCHC RBC-ENTMCNC: 30.9 PG
MCHC RBC-ENTMCNC: 33.7 GM/DL
MCV RBC AUTO: 91.8 FL
MONOCYTES # BLD AUTO: 0.43 K/UL
MONOCYTES NFR BLD AUTO: 5.2 %
NEUTROPHILS # BLD AUTO: 5.56 K/UL
NEUTROPHILS NFR BLD AUTO: 67.8 %
PLATELET # BLD AUTO: 193 K/UL
POTASSIUM SERPL-SCNC: 4.7 MMOL/L
PROT SERPL-MCNC: 7.3 G/DL
RBC # BLD: 5.01 M/UL
RBC # FLD: 13.7 %
SODIUM SERPL-SCNC: 144 MMOL/L
T4 FREE SERPL-MCNC: 1.4 NG/DL
TRIGL SERPL-MCNC: 71 MG/DL
TSH SERPL-ACNC: 5.38 UIU/ML
URATE SERPL-MCNC: 5.9 MG/DL
VIT B12 SERPL-MCNC: 625 PG/ML
WBC # FLD AUTO: 8.22 K/UL

## 2018-09-28 ENCOUNTER — APPOINTMENT (OUTPATIENT)
Dept: PULMONOLOGY | Facility: CLINIC | Age: 68
End: 2018-09-28
Payer: MEDICARE

## 2018-09-28 VITALS
WEIGHT: 174 LBS | BODY MASS INDEX: 24.91 KG/M2 | HEART RATE: 75 BPM | TEMPERATURE: 98.2 F | OXYGEN SATURATION: 98 % | SYSTOLIC BLOOD PRESSURE: 132 MMHG | DIASTOLIC BLOOD PRESSURE: 80 MMHG | HEIGHT: 70 IN

## 2018-09-28 PROCEDURE — 94010 BREATHING CAPACITY TEST: CPT

## 2018-09-28 PROCEDURE — 99204 OFFICE O/P NEW MOD 45 MIN: CPT | Mod: 25

## 2018-09-28 PROCEDURE — 90662 IIV NO PRSV INCREASED AG IM: CPT

## 2018-09-28 PROCEDURE — G0008: CPT

## 2018-10-02 ENCOUNTER — OUTPATIENT (OUTPATIENT)
Dept: OUTPATIENT SERVICES | Facility: HOSPITAL | Age: 68
LOS: 1 days | End: 2018-10-02
Payer: MEDICARE

## 2018-10-02 ENCOUNTER — APPOINTMENT (OUTPATIENT)
Dept: MRI IMAGING | Facility: HOSPITAL | Age: 68
End: 2018-10-02
Payer: MEDICARE

## 2018-10-02 PROCEDURE — 70551 MRI BRAIN STEM W/O DYE: CPT | Mod: 26

## 2018-10-02 PROCEDURE — 70551 MRI BRAIN STEM W/O DYE: CPT

## 2018-11-16 ENCOUNTER — CLINICAL ADVICE (OUTPATIENT)
Age: 68
End: 2018-11-16

## 2019-01-07 ENCOUNTER — APPOINTMENT (OUTPATIENT)
Dept: HEART AND VASCULAR | Facility: CLINIC | Age: 69
End: 2019-01-07

## 2019-02-21 NOTE — ED ADULT NURSE NOTE - RESPIRATORY WDL
DISPLAY PLAN FREE TEXT Breathing spontaneous and unlabored. Breath sounds clear and equal bilaterally with regular rhythm.

## 2019-03-05 ENCOUNTER — RX RENEWAL (OUTPATIENT)
Age: 69
End: 2019-03-05

## 2019-04-25 ENCOUNTER — NON-APPOINTMENT (OUTPATIENT)
Age: 69
End: 2019-04-25

## 2019-04-25 ENCOUNTER — APPOINTMENT (OUTPATIENT)
Dept: HEART AND VASCULAR | Facility: CLINIC | Age: 69
End: 2019-04-25
Payer: MEDICARE

## 2019-04-25 VITALS
SYSTOLIC BLOOD PRESSURE: 120 MMHG | HEIGHT: 70 IN | DIASTOLIC BLOOD PRESSURE: 78 MMHG | HEART RATE: 74 BPM | WEIGHT: 175 LBS | BODY MASS INDEX: 25.05 KG/M2

## 2019-04-25 PROCEDURE — 93000 ELECTROCARDIOGRAM COMPLETE: CPT

## 2019-04-25 PROCEDURE — 99214 OFFICE O/P EST MOD 30 MIN: CPT | Mod: 25

## 2019-04-25 NOTE — DISCUSSION/SUMMARY
[FreeTextEntry1] : The patient has atypical chest pain. It is not completely consistent but often has the characteristics of reflux. The patient's EKG is mildly abnormal. He will take Prilosec for two weeks. He will return for an exercise test. His blood pressure is controlled. He will continue his other medication.

## 2019-04-25 NOTE — HISTORY OF PRESENT ILLNESS
[FreeTextEntry1] : The patient has had chest pain for a few months. It usually resolves with TUMS and a few minutes. It is a substernal ache which usually occurs soon after food. It occurs twice a week. The patient walks one to two hours a day. He has not had any attacks of gout. His diet is good.

## 2019-04-25 NOTE — PHYSICAL EXAM
[General Appearance - Well Developed] : well developed [Normal Appearance] : normal appearance [Well Groomed] : well groomed [General Appearance - Well Nourished] : well nourished [No Deformities] : no deformities [General Appearance - In No Acute Distress] : no acute distress [Normal Conjunctiva] : the conjunctiva exhibited no abnormalities [Eyelids - No Xanthelasma] : the eyelids demonstrated no xanthelasmas [Normal Oral Mucosa] : normal oral mucosa [No Oral Pallor] : no oral pallor [No Oral Cyanosis] : no oral cyanosis [Normal Jugular Venous A Waves Present] : normal jugular venous A waves present [Normal Jugular Venous V Waves Present] : normal jugular venous V waves present [No Jugular Venous Goodman A Waves] : no jugular venous goodman A waves [Respiration, Rhythm And Depth] : normal respiratory rhythm and effort [Exaggerated Use Of Accessory Muscles For Inspiration] : no accessory muscle use [Auscultation Breath Sounds / Voice Sounds] : lungs were clear to auscultation bilaterally [Heart Rate And Rhythm] : heart rate and rhythm were normal [Heart Sounds] : normal S1 and S2 [Arterial Pulses Normal] : the arterial pulses were normal [FreeTextEntry1] : 1-2/6 systolic ejection murmur [Abdomen Tenderness] : non-tender [Abdomen Soft] : soft [Abdomen Mass (___ Cm)] : no abdominal mass palpated [Abnormal Walk] : normal gait [Gait - Sufficient For Exercise Testing] : the gait was sufficient for exercise testing [Cyanosis, Localized] : no localized cyanosis [Nail Clubbing] : no clubbing of the fingernails [Petechial Hemorrhages (___cm)] : no petechial hemorrhages [Skin Color & Pigmentation] : normal skin color and pigmentation [] : no rash [Skin Lesions] : no skin lesions [No Venous Stasis] : no venous stasis [No Skin Ulcers] : no skin ulcer [No Xanthoma] : no  xanthoma was observed [Affect] : the affect was normal [Oriented To Time, Place, And Person] : oriented to person, place, and time [Mood] : the mood was normal [No Anxiety] : not feeling anxious

## 2019-05-03 ENCOUNTER — APPOINTMENT (OUTPATIENT)
Dept: HEART AND VASCULAR | Facility: CLINIC | Age: 69
End: 2019-05-03
Payer: MEDICARE

## 2019-05-03 VITALS
HEART RATE: 70 BPM | OXYGEN SATURATION: 98 % | DIASTOLIC BLOOD PRESSURE: 80 MMHG | BODY MASS INDEX: 25.05 KG/M2 | WEIGHT: 175 LBS | SYSTOLIC BLOOD PRESSURE: 124 MMHG | HEIGHT: 70 IN

## 2019-05-03 PROCEDURE — 99213 OFFICE O/P EST LOW 20 MIN: CPT | Mod: 25

## 2019-05-03 PROCEDURE — 93351 STRESS TTE COMPLETE: CPT

## 2019-05-03 NOTE — PHYSICAL EXAM
[General Appearance - Well Developed] : well developed [Normal Appearance] : normal appearance [Well Groomed] : well groomed [No Deformities] : no deformities [General Appearance - Well Nourished] : well nourished [Eyelids - No Xanthelasma] : the eyelids demonstrated no xanthelasmas [Normal Conjunctiva] : the conjunctiva exhibited no abnormalities [General Appearance - In No Acute Distress] : no acute distress [No Oral Pallor] : no oral pallor [No Oral Cyanosis] : no oral cyanosis [Normal Oral Mucosa] : normal oral mucosa [No Jugular Venous Goodman A Waves] : no jugular venous goodman A waves [Normal Jugular Venous V Waves Present] : normal jugular venous V waves present [Normal Jugular Venous A Waves Present] : normal jugular venous A waves present [Respiration, Rhythm And Depth] : normal respiratory rhythm and effort [Heart Rate And Rhythm] : heart rate and rhythm were normal [Auscultation Breath Sounds / Voice Sounds] : lungs were clear to auscultation bilaterally [Exaggerated Use Of Accessory Muscles For Inspiration] : no accessory muscle use [Heart Sounds] : normal S1 and S2 [Abdomen Soft] : soft [Arterial Pulses Normal] : the arterial pulses were normal [FreeTextEntry1] : 1-2/6 systolic ejection murmur [Abdomen Tenderness] : non-tender [Abdomen Mass (___ Cm)] : no abdominal mass palpated [Nail Clubbing] : no clubbing of the fingernails [Gait - Sufficient For Exercise Testing] : the gait was sufficient for exercise testing [Abnormal Walk] : normal gait [Skin Color & Pigmentation] : normal skin color and pigmentation [Petechial Hemorrhages (___cm)] : no petechial hemorrhages [Cyanosis, Localized] : no localized cyanosis [] : no rash [Skin Lesions] : no skin lesions [No Venous Stasis] : no venous stasis [No Skin Ulcers] : no skin ulcer [No Xanthoma] : no  xanthoma was observed [Mood] : the mood was normal [Affect] : the affect was normal [Oriented To Time, Place, And Person] : oriented to person, place, and time [No Anxiety] : not feeling anxious

## 2019-05-03 NOTE — HISTORY OF PRESENT ILLNESS
[FreeTextEntry1] : At the end of exercise the patient has chest discomfort which is short-lived. It is occasional and resolves spontaneously. It occurs once a month for the last two years. He walks for two hours without difficulty.

## 2019-05-03 NOTE — DISCUSSION/SUMMARY
[FreeTextEntry1] : The patient has atypical chest pain. The baseline EKG is abnormal. Stress echocardiogram was normal. Significant coronary artery disease is unlikely. Blood pressure is well-controlled. He will continue his current medication.

## 2019-05-09 ENCOUNTER — APPOINTMENT (OUTPATIENT)
Dept: HEART AND VASCULAR | Facility: CLINIC | Age: 69
End: 2019-05-09

## 2019-05-10 LAB
ANION GAP SERPL CALC-SCNC: 13 MMOL/L
BUN SERPL-MCNC: 19 MG/DL
CALCIUM SERPL-MCNC: 10 MG/DL
CHLORIDE SERPL-SCNC: 104 MMOL/L
CHOLEST SERPL-MCNC: 140 MG/DL
CHOLEST/HDLC SERPL: 3.8 RATIO
CO2 SERPL-SCNC: 27 MMOL/L
CREAT SERPL-MCNC: 1.12 MG/DL
GLUCOSE SERPL-MCNC: 112 MG/DL
HDLC SERPL-MCNC: 37 MG/DL
LDLC SERPL CALC-MCNC: 74 MG/DL
MEV IGG FLD QL IA: 49 AU/ML
MEV IGG+IGM SER-IMP: POSITIVE
MUV AB SER-ACNC: POSITIVE
MUV IGG SER QL IA: 73.7 AU/ML
POTASSIUM SERPL-SCNC: 4.6 MMOL/L
RUBV IGG FLD-ACNC: <0.1 INDEX
RUBV IGG SER-IMP: NEGATIVE
SODIUM SERPL-SCNC: 144 MMOL/L
TRIGL SERPL-MCNC: 145 MG/DL
VZV AB TITR SER: NEGATIVE
VZV IGG SER IF-ACNC: 122.8 INDEX

## 2019-07-29 ENCOUNTER — EMERGENCY (EMERGENCY)
Facility: HOSPITAL | Age: 69
LOS: 1 days | Discharge: HOME | End: 2019-07-29
Attending: EMERGENCY MEDICINE | Admitting: EMERGENCY MEDICINE
Payer: MEDICARE

## 2019-07-29 VITALS
OXYGEN SATURATION: 98 % | DIASTOLIC BLOOD PRESSURE: 83 MMHG | RESPIRATION RATE: 16 BRPM | SYSTOLIC BLOOD PRESSURE: 160 MMHG | HEART RATE: 60 BPM | TEMPERATURE: 98 F

## 2019-07-29 VITALS
HEIGHT: 70 IN | SYSTOLIC BLOOD PRESSURE: 162 MMHG | TEMPERATURE: 99 F | RESPIRATION RATE: 18 BRPM | HEART RATE: 72 BPM | DIASTOLIC BLOOD PRESSURE: 80 MMHG | OXYGEN SATURATION: 98 % | WEIGHT: 174.83 LBS

## 2019-07-29 LAB
ALBUMIN SERPL ELPH-MCNC: 4.5 G/DL — SIGNIFICANT CHANGE UP (ref 3.3–5)
ALP SERPL-CCNC: 58 U/L — SIGNIFICANT CHANGE UP (ref 40–120)
ALT FLD-CCNC: 24 U/L — SIGNIFICANT CHANGE UP (ref 10–45)
ANION GAP SERPL CALC-SCNC: 10 MMOL/L — SIGNIFICANT CHANGE UP (ref 5–17)
APPEARANCE UR: CLEAR — SIGNIFICANT CHANGE UP
AST SERPL-CCNC: 21 U/L — SIGNIFICANT CHANGE UP (ref 10–40)
BASOPHILS # BLD AUTO: 0.04 K/UL — SIGNIFICANT CHANGE UP (ref 0–0.2)
BASOPHILS NFR BLD AUTO: 0.4 % — SIGNIFICANT CHANGE UP (ref 0–2)
BILIRUB SERPL-MCNC: 0.6 MG/DL — SIGNIFICANT CHANGE UP (ref 0.2–1.2)
BILIRUB UR-MCNC: NEGATIVE — SIGNIFICANT CHANGE UP
BUN SERPL-MCNC: 22 MG/DL — SIGNIFICANT CHANGE UP (ref 7–23)
CALCIUM SERPL-MCNC: 9.6 MG/DL — SIGNIFICANT CHANGE UP (ref 8.4–10.5)
CHLORIDE SERPL-SCNC: 105 MMOL/L — SIGNIFICANT CHANGE UP (ref 96–108)
CO2 SERPL-SCNC: 26 MMOL/L — SIGNIFICANT CHANGE UP (ref 22–31)
COLOR SPEC: YELLOW — SIGNIFICANT CHANGE UP
CREAT SERPL-MCNC: 1.03 MG/DL — SIGNIFICANT CHANGE UP (ref 0.5–1.3)
DIFF PNL FLD: NEGATIVE — SIGNIFICANT CHANGE UP
EOSINOPHIL # BLD AUTO: 0.04 K/UL — SIGNIFICANT CHANGE UP (ref 0–0.5)
EOSINOPHIL NFR BLD AUTO: 0.4 % — SIGNIFICANT CHANGE UP (ref 0–6)
EXTRA BLUE TOP TUBE: SIGNIFICANT CHANGE UP
GLUCOSE SERPL-MCNC: 112 MG/DL — HIGH (ref 70–99)
GLUCOSE UR QL: NEGATIVE — SIGNIFICANT CHANGE UP
HCT VFR BLD CALC: 44.1 % — SIGNIFICANT CHANGE UP (ref 39–50)
HGB BLD-MCNC: 15.1 G/DL — SIGNIFICANT CHANGE UP (ref 13–17)
IMM GRANULOCYTES NFR BLD AUTO: 0.2 % — SIGNIFICANT CHANGE UP (ref 0–1.5)
KETONES UR-MCNC: NEGATIVE — SIGNIFICANT CHANGE UP
LEUKOCYTE ESTERASE UR-ACNC: NEGATIVE — SIGNIFICANT CHANGE UP
LYMPHOCYTES # BLD AUTO: 1.31 K/UL — SIGNIFICANT CHANGE UP (ref 1–3.3)
LYMPHOCYTES # BLD AUTO: 13.9 % — SIGNIFICANT CHANGE UP (ref 13–44)
MCHC RBC-ENTMCNC: 30.7 PG — SIGNIFICANT CHANGE UP (ref 27–34)
MCHC RBC-ENTMCNC: 34.2 GM/DL — SIGNIFICANT CHANGE UP (ref 32–36)
MCV RBC AUTO: 89.6 FL — SIGNIFICANT CHANGE UP (ref 80–100)
MONOCYTES # BLD AUTO: 0.74 K/UL — SIGNIFICANT CHANGE UP (ref 0–0.9)
MONOCYTES NFR BLD AUTO: 7.9 % — SIGNIFICANT CHANGE UP (ref 2–14)
NEUTROPHILS # BLD AUTO: 7.27 K/UL — SIGNIFICANT CHANGE UP (ref 1.8–7.4)
NEUTROPHILS NFR BLD AUTO: 77.2 % — HIGH (ref 43–77)
NITRITE UR-MCNC: NEGATIVE — SIGNIFICANT CHANGE UP
NRBC # BLD: 0 /100 WBCS — SIGNIFICANT CHANGE UP (ref 0–0)
PH UR: 6 — SIGNIFICANT CHANGE UP (ref 5–8)
PLATELET # BLD AUTO: 185 K/UL — SIGNIFICANT CHANGE UP (ref 150–400)
POTASSIUM SERPL-MCNC: 4.7 MMOL/L — SIGNIFICANT CHANGE UP (ref 3.5–5.3)
POTASSIUM SERPL-SCNC: 4.7 MMOL/L — SIGNIFICANT CHANGE UP (ref 3.5–5.3)
PROT SERPL-MCNC: 7.5 G/DL — SIGNIFICANT CHANGE UP (ref 6–8.3)
PROT UR-MCNC: NEGATIVE MG/DL — SIGNIFICANT CHANGE UP
RBC # BLD: 4.92 M/UL — SIGNIFICANT CHANGE UP (ref 4.2–5.8)
RBC # FLD: 12.2 % — SIGNIFICANT CHANGE UP (ref 10.3–14.5)
SODIUM SERPL-SCNC: 141 MMOL/L — SIGNIFICANT CHANGE UP (ref 135–145)
SP GR SPEC: <=1.005 — SIGNIFICANT CHANGE UP (ref 1–1.03)
UROBILINOGEN FLD QL: 0.2 E.U./DL — SIGNIFICANT CHANGE UP
WBC # BLD: 9.42 K/UL — SIGNIFICANT CHANGE UP (ref 3.8–10.5)
WBC # FLD AUTO: 9.42 K/UL — SIGNIFICANT CHANGE UP (ref 3.8–10.5)

## 2019-07-29 PROCEDURE — 80053 COMPREHEN METABOLIC PANEL: CPT

## 2019-07-29 PROCEDURE — 96374 THER/PROPH/DIAG INJ IV PUSH: CPT

## 2019-07-29 PROCEDURE — 87086 URINE CULTURE/COLONY COUNT: CPT

## 2019-07-29 PROCEDURE — 74176 CT ABD & PELVIS W/O CONTRAST: CPT | Mod: 26

## 2019-07-29 PROCEDURE — 99284 EMERGENCY DEPT VISIT MOD MDM: CPT | Mod: 25

## 2019-07-29 PROCEDURE — 85025 COMPLETE CBC W/AUTO DIFF WBC: CPT

## 2019-07-29 PROCEDURE — 81003 URINALYSIS AUTO W/O SCOPE: CPT

## 2019-07-29 PROCEDURE — 74176 CT ABD & PELVIS W/O CONTRAST: CPT

## 2019-07-29 PROCEDURE — 99284 EMERGENCY DEPT VISIT MOD MDM: CPT

## 2019-07-29 PROCEDURE — 83690 ASSAY OF LIPASE: CPT

## 2019-07-29 PROCEDURE — 96361 HYDRATE IV INFUSION ADD-ON: CPT

## 2019-07-29 RX ORDER — METHOCARBAMOL 500 MG/1
2 TABLET, FILM COATED ORAL
Qty: 30 | Refills: 0
Start: 2019-07-29 | End: 2019-08-02

## 2019-07-29 RX ORDER — MORPHINE SULFATE 50 MG/1
4 CAPSULE, EXTENDED RELEASE ORAL ONCE
Refills: 0 | Status: DISCONTINUED | OUTPATIENT
Start: 2019-07-29 | End: 2019-07-29

## 2019-07-29 RX ORDER — SODIUM CHLORIDE 9 MG/ML
1000 INJECTION INTRAMUSCULAR; INTRAVENOUS; SUBCUTANEOUS ONCE
Refills: 0 | Status: COMPLETED | OUTPATIENT
Start: 2019-07-29 | End: 2019-07-29

## 2019-07-29 RX ORDER — OXYCODONE AND ACETAMINOPHEN 5; 325 MG/1; MG/1
1 TABLET ORAL ONCE
Refills: 0 | Status: DISCONTINUED | OUTPATIENT
Start: 2019-07-29 | End: 2019-07-29

## 2019-07-29 RX ORDER — KETOROLAC TROMETHAMINE 30 MG/ML
30 SYRINGE (ML) INJECTION ONCE
Refills: 0 | Status: DISCONTINUED | OUTPATIENT
Start: 2019-07-29 | End: 2019-07-29

## 2019-07-29 RX ORDER — METHOCARBAMOL 500 MG/1
500 TABLET, FILM COATED ORAL ONCE
Refills: 0 | Status: COMPLETED | OUTPATIENT
Start: 2019-07-29 | End: 2019-07-29

## 2019-07-29 RX ADMIN — SODIUM CHLORIDE 1000 MILLILITER(S): 9 INJECTION INTRAMUSCULAR; INTRAVENOUS; SUBCUTANEOUS at 15:26

## 2019-07-29 RX ADMIN — SODIUM CHLORIDE 1500 MILLILITER(S): 9 INJECTION INTRAMUSCULAR; INTRAVENOUS; SUBCUTANEOUS at 15:38

## 2019-07-29 RX ADMIN — METHOCARBAMOL 500 MILLIGRAM(S): 500 TABLET, FILM COATED ORAL at 16:09

## 2019-07-29 RX ADMIN — Medication 30 MILLIGRAM(S): at 13:23

## 2019-07-29 RX ADMIN — SODIUM CHLORIDE 1500 MILLILITER(S): 9 INJECTION INTRAMUSCULAR; INTRAVENOUS; SUBCUTANEOUS at 12:59

## 2019-07-29 RX ADMIN — OXYCODONE AND ACETAMINOPHEN 1 TABLET(S): 5; 325 TABLET ORAL at 16:09

## 2019-07-29 RX ADMIN — Medication 30 MILLIGRAM(S): at 13:08

## 2019-07-29 NOTE — ED PROVIDER NOTE - ATTENDING CONTRIBUTION TO CARE
69M PMH gallstone pancreatitis s/p cholecystectomy, nephrolithiasis (last episode 10 yrs ago, no intervention required), BPH, gout, HTN, p/w L flank pain, since ~0900, constant, radiating to anterior LLQ. Feels somewhat similar to prior kidney stone. No other systemic symptoms. Vitals wnl, exam as above.  ddx: renal colic vs. colitis vs. MSK vs. less likely pancreatitis.  cbc, cmp, lipase, UA, CT, IVf, symptom control, reassess.

## 2019-07-29 NOTE — ED ADULT NURSE NOTE - NSIMPLEMENTINTERV_GEN_ALL_ED
Implemented All Universal Safety Interventions:  [place] to call system. Call bell, personal items and telephone within reach. Instruct patient to call for assistance. Room bathroom lighting operational. Non-slip footwear when patient is off stretcher. Physically safe environment: no spills, clutter or unnecessary equipment. Stretcher in lowest position, wheels locked, appropriate side rails in place.

## 2019-07-29 NOTE — ED PROVIDER NOTE - OBJECTIVE STATEMENT
69M PMH gallstone pancreatitis s/p cholecystectomy, nephrolithiasis (last episode 10 yrs ago, no intervention required), BPH, gout, HTN, p/w L flank pain, since ~0900, constant, radiating to anterior LLQ. Feels somewhat similar to prior kidney stone. Denies f/c, NVD, black/bloody stool, urinary complaints, focal weakness/numbness, lightheadedness, other back pain, testicular/penile pain, rashes, SOB/CP, URI symptoms. Normal PO intake, normal BMs.

## 2019-07-29 NOTE — ED PROVIDER NOTE - GASTROINTESTINAL, MLM
Abdomen soft, non-tender, no guarding. Abdomen soft, non-tender, no guarding. + left sided flank pain , no CVA tenderness

## 2019-07-29 NOTE — ED ADULT NURSE NOTE - OBJECTIVE STATEMENT
Patient presents to the ED complaining of left sided flank pain radiating to the right side of his abdomen since 930AM this morning. Patient reports history of kidney stones. No nausea or vomiting or diarrhea.

## 2019-07-30 LAB
CULTURE RESULTS: NO GROWTH — SIGNIFICANT CHANGE UP
SPECIMEN SOURCE: SIGNIFICANT CHANGE UP

## 2019-08-02 DIAGNOSIS — Z79.899 OTHER LONG TERM (CURRENT) DRUG THERAPY: ICD-10-CM

## 2019-08-02 DIAGNOSIS — I10 ESSENTIAL (PRIMARY) HYPERTENSION: ICD-10-CM

## 2019-08-02 DIAGNOSIS — E78.00 PURE HYPERCHOLESTEROLEMIA, UNSPECIFIED: ICD-10-CM

## 2019-08-02 DIAGNOSIS — R10.9 UNSPECIFIED ABDOMINAL PAIN: ICD-10-CM

## 2019-08-09 ENCOUNTER — APPOINTMENT (OUTPATIENT)
Dept: HEART AND VASCULAR | Facility: CLINIC | Age: 69
End: 2019-08-09
Payer: MEDICARE

## 2019-08-09 VITALS
BODY MASS INDEX: 24.34 KG/M2 | OXYGEN SATURATION: 98 % | HEART RATE: 74 BPM | WEIGHT: 170 LBS | SYSTOLIC BLOOD PRESSURE: 132 MMHG | HEIGHT: 70 IN | DIASTOLIC BLOOD PRESSURE: 80 MMHG

## 2019-08-09 PROCEDURE — 99213 OFFICE O/P EST LOW 20 MIN: CPT | Mod: 25

## 2019-08-09 PROCEDURE — 93000 ELECTROCARDIOGRAM COMPLETE: CPT

## 2019-08-09 NOTE — PHYSICAL EXAM
[General Appearance - Well Developed] : well developed [Normal Appearance] : normal appearance [Well Groomed] : well groomed [General Appearance - Well Nourished] : well nourished [No Deformities] : no deformities [General Appearance - In No Acute Distress] : no acute distress [Normal Conjunctiva] : the conjunctiva exhibited no abnormalities [Eyelids - No Xanthelasma] : the eyelids demonstrated no xanthelasmas [Normal Oral Mucosa] : normal oral mucosa [No Oral Pallor] : no oral pallor [No Oral Cyanosis] : no oral cyanosis [Normal Jugular Venous A Waves Present] : normal jugular venous A waves present [Normal Jugular Venous V Waves Present] : normal jugular venous V waves present [No Jugular Venous Goodman A Waves] : no jugular venous goodman A waves [Respiration, Rhythm And Depth] : normal respiratory rhythm and effort [Exaggerated Use Of Accessory Muscles For Inspiration] : no accessory muscle use [Auscultation Breath Sounds / Voice Sounds] : lungs were clear to auscultation bilaterally [Heart Rate And Rhythm] : heart rate and rhythm were normal [Heart Sounds] : normal S1 and S2 [Arterial Pulses Normal] : the arterial pulses were normal [FreeTextEntry1] : 2/6 systolic ejection murmur [Abdomen Soft] : soft [Abdomen Tenderness] : non-tender [Abdomen Mass (___ Cm)] : no abdominal mass palpated [Abnormal Walk] : normal gait [Gait - Sufficient For Exercise Testing] : the gait was sufficient for exercise testing [Nail Clubbing] : no clubbing of the fingernails [Cyanosis, Localized] : no localized cyanosis [Petechial Hemorrhages (___cm)] : no petechial hemorrhages [Skin Color & Pigmentation] : normal skin color and pigmentation [] : no rash [No Venous Stasis] : no venous stasis [Skin Lesions] : no skin lesions [No Skin Ulcers] : no skin ulcer [No Xanthoma] : no  xanthoma was observed [Oriented To Time, Place, And Person] : oriented to person, place, and time [Affect] : the affect was normal [Mood] : the mood was normal [No Anxiety] : not feeling anxious

## 2019-08-09 NOTE — HISTORY OF PRESENT ILLNESS
[FreeTextEntry1] : A week ago the patient developed left flank pain which radiated to his abdomen. He was seen in the emergency room and a CAT scan showed no kidney stones. The lipase was elevated. The patient saw Dr. Rhodes and repeat laboratory tests were done. The patient had an episode today of one minute of left upper quadrant pain. He has been eating well. After the episode he had constipation for three days which resolved. He had taken Vicodin. Patient does not drink alcohol.

## 2019-08-11 ENCOUNTER — APPOINTMENT (OUTPATIENT)
Dept: MRI IMAGING | Facility: HOSPITAL | Age: 69
End: 2019-08-11
Payer: MEDICARE

## 2019-08-11 ENCOUNTER — OUTPATIENT (OUTPATIENT)
Dept: OUTPATIENT SERVICES | Facility: HOSPITAL | Age: 69
LOS: 1 days | End: 2019-08-11
Payer: MEDICARE

## 2019-08-11 PROCEDURE — 74182 MRI ABDOMEN W/CONTRAST: CPT

## 2019-08-11 PROCEDURE — A9585: CPT

## 2019-08-11 PROCEDURE — 74182 MRI ABDOMEN W/CONTRAST: CPT | Mod: 26

## 2019-10-07 ENCOUNTER — NON-APPOINTMENT (OUTPATIENT)
Age: 69
End: 2019-10-07

## 2019-10-07 ENCOUNTER — APPOINTMENT (OUTPATIENT)
Dept: HEART AND VASCULAR | Facility: CLINIC | Age: 69
End: 2019-10-07
Payer: MEDICARE

## 2019-10-07 VITALS
BODY MASS INDEX: 24.62 KG/M2 | HEART RATE: 67 BPM | SYSTOLIC BLOOD PRESSURE: 138 MMHG | DIASTOLIC BLOOD PRESSURE: 80 MMHG | WEIGHT: 172 LBS | HEIGHT: 70 IN

## 2019-10-07 VITALS — SYSTOLIC BLOOD PRESSURE: 120 MMHG | DIASTOLIC BLOOD PRESSURE: 72 MMHG

## 2019-10-07 PROCEDURE — G0439: CPT

## 2019-10-07 PROCEDURE — 93000 ELECTROCARDIOGRAM COMPLETE: CPT

## 2019-10-07 PROCEDURE — 36415 COLL VENOUS BLD VENIPUNCTURE: CPT

## 2019-10-07 NOTE — PHYSICAL EXAM
[General Appearance - Well Developed] : well developed [Well Groomed] : well groomed [Normal Appearance] : normal appearance [No Deformities] : no deformities [General Appearance - Well Nourished] : well nourished [General Appearance - In No Acute Distress] : no acute distress [Normal Conjunctiva] : the conjunctiva exhibited no abnormalities [Eyelids - No Xanthelasma] : the eyelids demonstrated no xanthelasmas [Normal Oral Mucosa] : normal oral mucosa [No Oral Pallor] : no oral pallor [No Oral Cyanosis] : no oral cyanosis [No Jugular Venous Goodman A Waves] : no jugular venous goodman A waves [Normal Jugular Venous V Waves Present] : normal jugular venous V waves present [Normal Jugular Venous A Waves Present] : normal jugular venous A waves present [Auscultation Breath Sounds / Voice Sounds] : lungs were clear to auscultation bilaterally [Respiration, Rhythm And Depth] : normal respiratory rhythm and effort [Exaggerated Use Of Accessory Muscles For Inspiration] : no accessory muscle use [Heart Rate And Rhythm] : heart rate and rhythm were normal [Heart Sounds] : normal S1 and S2 [Arterial Pulses Normal] : the arterial pulses were normal [Abdomen Soft] : soft [Abdomen Tenderness] : non-tender [Abdomen Mass (___ Cm)] : no abdominal mass palpated [FreeTextEntry1] : Rectal at gu, testicular and hernia exams are normal. [Abnormal Walk] : normal gait [Gait - Sufficient For Exercise Testing] : the gait was sufficient for exercise testing [Nail Clubbing] : no clubbing of the fingernails [Cyanosis, Localized] : no localized cyanosis [Petechial Hemorrhages (___cm)] : no petechial hemorrhages [Skin Color & Pigmentation] : normal skin color and pigmentation [] : no rash [No Venous Stasis] : no venous stasis [Skin Lesions] : no skin lesions [No Skin Ulcers] : no skin ulcer [No Xanthoma] : no  xanthoma was observed [Mood] : the mood was normal [Oriented To Time, Place, And Person] : oriented to person, place, and time [Affect] : the affect was normal [No Anxiety] : not feeling anxious

## 2019-10-07 NOTE — DISCUSSION/SUMMARY
[FreeTextEntry1] : The patient is planning an MRI for pancreatitis. He will followup with the gastroenterologist. He has occasional brief abdominal pain. He is up-to-date on his health maintenance. His EKG is normal. His blood pressure is controlled. Laboratory testing was done. He'll continue his current medication.

## 2019-10-07 NOTE — HISTORY OF PRESENT ILLNESS
[FreeTextEntry1] : The patient has had an endoscopy and abdominal sonogram for pancreatitis. and MRI as planned. He does not drink alcohol. He walks for two hours. His diet is fairly good. He has occasional brief abdominal pain. He had an eye examination 1.5 years ago. He sees a dermatologist. He sees the urologist who performs a rectal exam. He is fasting.

## 2019-10-08 LAB
ALBUMIN SERPL ELPH-MCNC: 4.7 G/DL
ALP BLD-CCNC: 62 U/L
ALT SERPL-CCNC: 18 U/L
ANION GAP SERPL CALC-SCNC: 13 MMOL/L
AST SERPL-CCNC: 17 U/L
BASOPHILS # BLD AUTO: 0.05 K/UL
BASOPHILS NFR BLD AUTO: 0.7 %
BILIRUB SERPL-MCNC: 0.5 MG/DL
BUN SERPL-MCNC: 21 MG/DL
CALCIUM SERPL-MCNC: 9.6 MG/DL
CHLORIDE SERPL-SCNC: 103 MMOL/L
CHOLEST SERPL-MCNC: 139 MG/DL
CHOLEST/HDLC SERPL: 3.5 RATIO
CO2 SERPL-SCNC: 26 MMOL/L
CREAT SERPL-MCNC: 1.14 MG/DL
EOSINOPHIL # BLD AUTO: 0.08 K/UL
EOSINOPHIL NFR BLD AUTO: 1.1 %
ESTIMATED AVERAGE GLUCOSE: 108 MG/DL
GLUCOSE SERPL-MCNC: 116 MG/DL
HBA1C MFR BLD HPLC: 5.4 %
HCT VFR BLD CALC: 45.1 %
HCV AB SER QL: NONREACTIVE
HCV S/CO RATIO: 0.08 S/CO
HDLC SERPL-MCNC: 40 MG/DL
HGB BLD-MCNC: 15.1 G/DL
IMM GRANULOCYTES NFR BLD AUTO: 0.4 %
LDLC SERPL CALC-MCNC: 87 MG/DL
LYMPHOCYTES # BLD AUTO: 1.47 K/UL
LYMPHOCYTES NFR BLD AUTO: 20.6 %
MAN DIFF?: NORMAL
MCHC RBC-ENTMCNC: 30.4 PG
MCHC RBC-ENTMCNC: 33.5 GM/DL
MCV RBC AUTO: 90.7 FL
MONOCYTES # BLD AUTO: 0.56 K/UL
MONOCYTES NFR BLD AUTO: 7.9 %
NEUTROPHILS # BLD AUTO: 4.93 K/UL
NEUTROPHILS NFR BLD AUTO: 69.3 %
PLATELET # BLD AUTO: 202 K/UL
POTASSIUM SERPL-SCNC: 4.7 MMOL/L
PROT SERPL-MCNC: 6.9 G/DL
RBC # BLD: 4.97 M/UL
RBC # FLD: 12.9 %
SODIUM SERPL-SCNC: 142 MMOL/L
T4 FREE SERPL-MCNC: 1.2 NG/DL
TRIGL SERPL-MCNC: 59 MG/DL
TSH SERPL-ACNC: 3.2 UIU/ML
WBC # FLD AUTO: 7.12 K/UL

## 2019-11-06 ENCOUNTER — RX RENEWAL (OUTPATIENT)
Age: 69
End: 2019-11-06

## 2020-06-04 ENCOUNTER — RX RENEWAL (OUTPATIENT)
Age: 70
End: 2020-06-04

## 2020-06-25 ENCOUNTER — APPOINTMENT (OUTPATIENT)
Dept: HEART AND VASCULAR | Facility: CLINIC | Age: 70
End: 2020-06-25
Payer: MEDICARE

## 2020-06-25 PROCEDURE — 99213 OFFICE O/P EST LOW 20 MIN: CPT | Mod: 95

## 2020-06-25 RX ORDER — PNEUMOCOCCAL 13-VALENT CONJUGATE VACCINE 2.2; 2.2; 2.2; 2.2; 2.2; 4.4; 2.2; 2.2; 2.2; 2.2; 2.2; 2.2; 2.2 UG/.5ML; UG/.5ML; UG/.5ML; UG/.5ML; UG/.5ML; UG/.5ML; UG/.5ML; UG/.5ML; UG/.5ML; UG/.5ML; UG/.5ML; UG/.5ML; UG/.5ML
INJECTION, SUSPENSION INTRAMUSCULAR
Qty: 1 | Refills: 0 | Status: DISCONTINUED | COMMUNITY
Start: 2018-09-06 | End: 2020-06-25

## 2020-06-25 NOTE — DISCUSSION/SUMMARY
[FreeTextEntry1] : The patient has had elbow pain for most of the last 1.5 months. This may be gout. He will take nonsteroidal anti-inflammatories naproxen over-the-counter twice a day for a week. His illness in March was probably not Corona virus. The patient will check his blood pressure at home. He will continue his current medications.

## 2020-06-25 NOTE — HISTORY OF PRESENT ILLNESS
[Home] : at home, [unfilled] , at the time of the visit. [Other Location: e.g. Home (Enter Location, City,State)___] : at [unfilled] [Verbal consent obtained from patient] : the patient, [unfilled] [FreeTextEntry1] : [employer] video failed. The patient has had right elbow pain for the last 1.5 months. There was no injury. He senses that it is swollen. It responds to aleve but returns when he stops it. In March the patient had a slight illness and a temperature of 99. He cycles 30 minutes and walks 40 blocks. He has not checked his blood pressure. His weight is 169.

## 2020-08-04 ENCOUNTER — APPOINTMENT (OUTPATIENT)
Dept: ORTHOPEDIC SURGERY | Facility: CLINIC | Age: 70
End: 2020-08-04
Payer: MEDICARE

## 2020-08-04 VITALS
HEART RATE: 78 BPM | HEIGHT: 70 IN | DIASTOLIC BLOOD PRESSURE: 83 MMHG | OXYGEN SATURATION: 98 % | SYSTOLIC BLOOD PRESSURE: 154 MMHG | WEIGHT: 169 LBS | BODY MASS INDEX: 24.2 KG/M2

## 2020-08-04 PROCEDURE — 99214 OFFICE O/P EST MOD 30 MIN: CPT

## 2020-08-04 PROCEDURE — 73070 X-RAY EXAM OF ELBOW: CPT | Mod: RT

## 2020-08-04 NOTE — ASSESSMENT
[FreeTextEntry1] : 70-year-old rIGHT lateral elbow pain going on for about 5-6 months consistent with RIGHT lateral epicondylitis. I recommended home exercises, avoiding painful activities, heat, ice, Voltaren gel. He could try a tennis elbow strap.\par I reviewed home exercises with him.\par It typically will linger for a while but should get better. If it's not getting better we may try an injection. Rarely this would need surgery.\par He should followup if it's not getting better or certainly if it's worse in the next few months or as needed.

## 2020-08-04 NOTE — HISTORY OF PRESENT ILLNESS
[de-identified] : Mr. Hedrick is a 70-year-old right-hand-dominant dominant who presents with RIGHT elbow pain that began arch 2020 without any injury or inciting event. He does notice pain in his elbow and there was some mild swelling. He has a history of gout and thought it might be gout. Pain is better with Aleve which he has taken intermittently.\par pain is about 5/10 intermittently and achy and localized. It's better with rest, heat and Aleve than worse with moving his arm and pressing against the tender spot. He has an than any other treatment such as physical therapy.No injections. No history of any trauma or injury to the elbow in the past.

## 2020-11-02 ENCOUNTER — RX RENEWAL (OUTPATIENT)
Age: 70
End: 2020-11-02

## 2020-11-03 ENCOUNTER — APPOINTMENT (OUTPATIENT)
Dept: HEART AND VASCULAR | Facility: CLINIC | Age: 70
End: 2020-11-03
Payer: MEDICARE

## 2020-11-03 ENCOUNTER — NON-APPOINTMENT (OUTPATIENT)
Age: 70
End: 2020-11-03

## 2020-11-03 VITALS
BODY MASS INDEX: 24.05 KG/M2 | HEIGHT: 70 IN | DIASTOLIC BLOOD PRESSURE: 70 MMHG | HEART RATE: 78 BPM | SYSTOLIC BLOOD PRESSURE: 128 MMHG | WEIGHT: 168 LBS

## 2020-11-03 VITALS — TEMPERATURE: 98 F

## 2020-11-03 PROCEDURE — G0439: CPT

## 2020-11-03 PROCEDURE — 93000 ELECTROCARDIOGRAM COMPLETE: CPT

## 2020-11-03 PROCEDURE — 36415 COLL VENOUS BLD VENIPUNCTURE: CPT

## 2020-11-03 PROCEDURE — 93306 TTE W/DOPPLER COMPLETE: CPT

## 2020-11-03 NOTE — PHYSICAL EXAM
[General Appearance - Well Developed] : well developed [Normal Appearance] : normal appearance [Well Groomed] : well groomed [General Appearance - Well Nourished] : well nourished [No Deformities] : no deformities [General Appearance - In No Acute Distress] : no acute distress [Normal Conjunctiva] : the conjunctiva exhibited no abnormalities [Eyelids - No Xanthelasma] : the eyelids demonstrated no xanthelasmas [Normal Oral Mucosa] : normal oral mucosa [No Oral Pallor] : no oral pallor [No Oral Cyanosis] : no oral cyanosis [Normal Jugular Venous A Waves Present] : normal jugular venous A waves present [Normal Jugular Venous V Waves Present] : normal jugular venous V waves present [No Jugular Venous Goodman A Waves] : no jugular venous goodman A waves [Respiration, Rhythm And Depth] : normal respiratory rhythm and effort [Exaggerated Use Of Accessory Muscles For Inspiration] : no accessory muscle use [Auscultation Breath Sounds / Voice Sounds] : lungs were clear to auscultation bilaterally [Heart Rate And Rhythm] : heart rate and rhythm were normal [Heart Sounds] : normal S1 and S2 [Arterial Pulses Normal] : the arterial pulses were normal [Abdomen Soft] : soft [Abdomen Tenderness] : non-tender [Abdomen Mass (___ Cm)] : no abdominal mass palpated [FreeTextEntry1] : Rectal, testicular and hernia exams are normal. [Abnormal Walk] : normal gait [Gait - Sufficient For Exercise Testing] : the gait was sufficient for exercise testing [Nail Clubbing] : no clubbing of the fingernails [Cyanosis, Localized] : no localized cyanosis [Petechial Hemorrhages (___cm)] : no petechial hemorrhages [Skin Color & Pigmentation] : normal skin color and pigmentation [] : no rash [No Venous Stasis] : no venous stasis [Skin Lesions] : no skin lesions [No Skin Ulcers] : no skin ulcer [No Xanthoma] : no  xanthoma was observed [Oriented To Time, Place, And Person] : oriented to person, place, and time [Affect] : the affect was normal [Mood] : the mood was normal [No Anxiety] : not feeling anxious

## 2020-11-03 NOTE — HISTORY OF PRESENT ILLNESS
[FreeTextEntry1] : The patient walks 25 blocks three times a week. He occasionally has left upper chest discomfort lasting for seconds. It is positional occurs with tension and not with exercise. He occasionally takes his B12. He had an eye exam two years ago. He received the flu vaccine.

## 2020-11-03 NOTE — DISCUSSION/SUMMARY
[FreeTextEntry1] : The patient has atypical chest pain. His EKG is abnormal but without significant change. The echocardiogram shows an ejection fraction of 60% without significant valvular disease. His pain is noncardiac. He is referred for an eye examination. He declines a tetanus shot. His hearing is significantly reduced. He has seen the audiologist and declined a hearing aid. His blood pressure is controlled. He will continue his current medication.The patient will contact me what the names of his other providers. He is not having any cognitive impairment. He has no significant current or past depression. He is fully functional and there are no safety issues. He will be screened for eye examination, Colonoscopy  and Immunizations over the next 10 years. The patient was furnished with personalized health advice and does not need referral to health education or preventative counseling services. Patient does not need advanced care planning services.\par

## 2020-11-04 LAB
ALBUMIN SERPL ELPH-MCNC: 4.8 G/DL
ALP BLD-CCNC: 87 U/L
ALT SERPL-CCNC: 18 U/L
ANION GAP SERPL CALC-SCNC: 13 MMOL/L
AST SERPL-CCNC: 16 U/L
BASOPHILS # BLD AUTO: 0.05 K/UL
BASOPHILS NFR BLD AUTO: 0.6 %
BILIRUB SERPL-MCNC: 0.7 MG/DL
BUN SERPL-MCNC: 14 MG/DL
CALCIUM SERPL-MCNC: 9.8 MG/DL
CHLORIDE SERPL-SCNC: 103 MMOL/L
CHOLEST SERPL-MCNC: 142 MG/DL
CO2 SERPL-SCNC: 28 MMOL/L
CREAT SERPL-MCNC: 1.2 MG/DL
EOSINOPHIL # BLD AUTO: 0.11 K/UL
EOSINOPHIL NFR BLD AUTO: 1.4 %
ESTIMATED AVERAGE GLUCOSE: 108 MG/DL
FOLATE SERPL-MCNC: 15 NG/ML
GLUCOSE SERPL-MCNC: 117 MG/DL
HBA1C MFR BLD HPLC: 5.4 %
HCT VFR BLD CALC: 46 %
HCV AB SER QL: NONREACTIVE
HCV S/CO RATIO: 0.06 S/CO
HDLC SERPL-MCNC: 38 MG/DL
HGB BLD-MCNC: 14.9 G/DL
IMM GRANULOCYTES NFR BLD AUTO: 0.3 %
LDLC SERPL CALC-MCNC: 79 MG/DL
LYMPHOCYTES # BLD AUTO: 2.05 K/UL
LYMPHOCYTES NFR BLD AUTO: 26 %
MAN DIFF?: NORMAL
MCHC RBC-ENTMCNC: 30.3 PG
MCHC RBC-ENTMCNC: 32.4 GM/DL
MCV RBC AUTO: 93.7 FL
MONOCYTES # BLD AUTO: 0.65 K/UL
MONOCYTES NFR BLD AUTO: 8.3 %
NEUTROPHILS # BLD AUTO: 4.99 K/UL
NEUTROPHILS NFR BLD AUTO: 63.4 %
NONHDLC SERPL-MCNC: 104 MG/DL
PLATELET # BLD AUTO: 237 K/UL
POTASSIUM SERPL-SCNC: 4.4 MMOL/L
PROT SERPL-MCNC: 7 G/DL
RBC # BLD: 4.91 M/UL
RBC # FLD: 13.2 %
SODIUM SERPL-SCNC: 144 MMOL/L
TRIGL SERPL-MCNC: 126 MG/DL
TSH SERPL-ACNC: 6.08 UIU/ML
URATE SERPL-MCNC: 4.8 MG/DL
VIT B12 SERPL-MCNC: 649 PG/ML
WBC # FLD AUTO: 7.87 K/UL

## 2021-02-10 ENCOUNTER — APPOINTMENT (OUTPATIENT)
Dept: HEART AND VASCULAR | Facility: CLINIC | Age: 71
End: 2021-02-10
Payer: MEDICARE

## 2021-02-10 ENCOUNTER — NON-APPOINTMENT (OUTPATIENT)
Age: 71
End: 2021-02-10

## 2021-02-10 VITALS
SYSTOLIC BLOOD PRESSURE: 160 MMHG | BODY MASS INDEX: 24.34 KG/M2 | DIASTOLIC BLOOD PRESSURE: 80 MMHG | HEIGHT: 70 IN | HEART RATE: 68 BPM | TEMPERATURE: 97.2 F | WEIGHT: 170 LBS

## 2021-02-10 VITALS — SYSTOLIC BLOOD PRESSURE: 156 MMHG | DIASTOLIC BLOOD PRESSURE: 76 MMHG

## 2021-02-10 PROCEDURE — 36415 COLL VENOUS BLD VENIPUNCTURE: CPT

## 2021-02-10 PROCEDURE — 99214 OFFICE O/P EST MOD 30 MIN: CPT | Mod: 25

## 2021-02-10 PROCEDURE — 93000 ELECTROCARDIOGRAM COMPLETE: CPT

## 2021-02-10 NOTE — DISCUSSION/SUMMARY
[FreeTextEntry1] : The patient has been having left-sided abdominal pain.  He has a history of pancreatitis.  He has not been drinking alcohol.  Urine and blood testing was ordered.  The report of his abdominal ultrasound was reviewed.  The prior EKG was also reviewed.  The blood pressure is elevated.  The patient will attempt to improve his diet.  He will follow up with a gastroenterologist Dr. Rhodes.  He will return for a blood pressure check in 6 weeks.  His EKG shows ST depression.  The prior stress test report was reviewed.  It was normal.  Significant CAD is unlikely.

## 2021-02-10 NOTE — HISTORY OF PRESENT ILLNESS
[FreeTextEntry1] : The patient has had left-sided abdominal pain.  It is dull and occurs in different locations.  It is sometimes after eating.  It lasts from minutes to hours and occurs daily.  It is mild.  He has no other GI symptoms.  He had pain at the base of his right first toe.  Eventually the wound came and pus came out.  This is healed.  The patient walks 40 blocks 3 times a week and bicycles 30 minutes.  His diet is good.  He has not yet received the Prevnar vaccine.  He has not been drinking alcohol.

## 2021-02-10 NOTE — PHYSICAL EXAM
[General Appearance - Well Developed] : well developed [Normal Appearance] : normal appearance [Well Groomed] : well groomed [General Appearance - Well Nourished] : well nourished [No Deformities] : no deformities [General Appearance - In No Acute Distress] : no acute distress [Normal Conjunctiva] : the conjunctiva exhibited no abnormalities [Eyelids - No Xanthelasma] : the eyelids demonstrated no xanthelasmas [Normal Oral Mucosa] : normal oral mucosa [No Oral Pallor] : no oral pallor [No Oral Cyanosis] : no oral cyanosis [Normal Jugular Venous A Waves Present] : normal jugular venous A waves present [No Jugular Venous Goodman A Waves] : no jugular venous goodman A waves [Normal Jugular Venous V Waves Present] : normal jugular venous V waves present [Exaggerated Use Of Accessory Muscles For Inspiration] : no accessory muscle use [Respiration, Rhythm And Depth] : normal respiratory rhythm and effort [Auscultation Breath Sounds / Voice Sounds] : lungs were clear to auscultation bilaterally [Heart Rate And Rhythm] : heart rate and rhythm were normal [Arterial Pulses Normal] : the arterial pulses were normal [Heart Sounds] : normal S1 and S2 [Abdomen Soft] : soft [Abdomen Tenderness] : non-tender [Abdomen Mass (___ Cm)] : no abdominal mass palpated [FreeTextEntry1] : .  [Abnormal Walk] : normal gait [Gait - Sufficient For Exercise Testing] : the gait was sufficient for exercise testing [Nail Clubbing] : no clubbing of the fingernails [Cyanosis, Localized] : no localized cyanosis [Petechial Hemorrhages (___cm)] : no petechial hemorrhages [Skin Color & Pigmentation] : normal skin color and pigmentation [No Venous Stasis] : no venous stasis [] : no rash [Skin Lesions] : no skin lesions [No Skin Ulcers] : no skin ulcer [No Xanthoma] : no  xanthoma was observed [Oriented To Time, Place, And Person] : oriented to person, place, and time [Affect] : the affect was normal [Mood] : the mood was normal [No Anxiety] : not feeling anxious

## 2021-02-11 LAB
ALBUMIN SERPL ELPH-MCNC: 4.8 G/DL
ALP BLD-CCNC: 81 U/L
ALT SERPL-CCNC: 20 U/L
ANION GAP SERPL CALC-SCNC: 11 MMOL/L
APPEARANCE: CLEAR
APPEARANCE: CLEAR
AST SERPL-CCNC: 18 U/L
BACTERIA: NEGATIVE
BILIRUB SERPL-MCNC: 0.8 MG/DL
BILIRUBIN URINE: NEGATIVE
BILIRUBIN URINE: NEGATIVE
BLOOD URINE: NEGATIVE
BLOOD URINE: NEGATIVE
BUN SERPL-MCNC: 18 MG/DL
CALCIUM SERPL-MCNC: 9.6 MG/DL
CHLORIDE SERPL-SCNC: 103 MMOL/L
CO2 SERPL-SCNC: 27 MMOL/L
COLOR: NORMAL
COLOR: NORMAL
CREAT SERPL-MCNC: 1.19 MG/DL
FOLATE SERPL-MCNC: 10.3 NG/ML
GLUCOSE QUALITATIVE U: NEGATIVE
GLUCOSE QUALITATIVE U: NEGATIVE
GLUCOSE SERPL-MCNC: 101 MG/DL
HYALINE CASTS: 2 /LPF
KETONES URINE: NEGATIVE
KETONES URINE: NEGATIVE
LEUKOCYTE ESTERASE URINE: NEGATIVE
LEUKOCYTE ESTERASE URINE: NEGATIVE
LPL SERPL-CCNC: 29 U/L
MICROSCOPIC-UA: NORMAL
NITRITE URINE: NEGATIVE
NITRITE URINE: NEGATIVE
PH URINE: 5.5
PH URINE: 5.5
POTASSIUM SERPL-SCNC: 4.4 MMOL/L
PROT SERPL-MCNC: 7.1 G/DL
PROTEIN URINE: NEGATIVE
PROTEIN URINE: NEGATIVE
RED BLOOD CELLS URINE: 0 /HPF
SODIUM SERPL-SCNC: 142 MMOL/L
SPECIFIC GRAVITY URINE: 1.01
SPECIFIC GRAVITY URINE: 1.01
SQUAMOUS EPITHELIAL CELLS: 0 /HPF
T4 FREE SERPL-MCNC: 1.2 NG/DL
TSH SERPL-ACNC: 2.96 UIU/ML
UROBILINOGEN URINE: NORMAL
UROBILINOGEN URINE: NORMAL
VIT B12 SERPL-MCNC: 462 PG/ML
WHITE BLOOD CELLS URINE: 0 /HPF

## 2021-03-11 ENCOUNTER — APPOINTMENT (OUTPATIENT)
Dept: HEART AND VASCULAR | Facility: CLINIC | Age: 71
End: 2021-03-11

## 2021-03-22 ENCOUNTER — APPOINTMENT (OUTPATIENT)
Dept: HEART AND VASCULAR | Facility: CLINIC | Age: 71
End: 2021-03-22
Payer: MEDICARE

## 2021-03-22 VITALS
SYSTOLIC BLOOD PRESSURE: 154 MMHG | HEART RATE: 75 BPM | TEMPERATURE: 97 F | DIASTOLIC BLOOD PRESSURE: 86 MMHG | OXYGEN SATURATION: 98 %

## 2021-03-22 VITALS — SYSTOLIC BLOOD PRESSURE: 160 MMHG | DIASTOLIC BLOOD PRESSURE: 86 MMHG

## 2021-03-22 PROCEDURE — 99214 OFFICE O/P EST MOD 30 MIN: CPT

## 2021-03-22 NOTE — PHYSICAL EXAM
[General Appearance - Well Developed] : well developed [Normal Appearance] : normal appearance [Well Groomed] : well groomed [General Appearance - Well Nourished] : well nourished [No Deformities] : no deformities [General Appearance - In No Acute Distress] : no acute distress [Normal Conjunctiva] : the conjunctiva exhibited no abnormalities [Eyelids - No Xanthelasma] : the eyelids demonstrated no xanthelasmas [Normal Oral Mucosa] : normal oral mucosa [No Oral Pallor] : no oral pallor [No Oral Cyanosis] : no oral cyanosis [Normal Jugular Venous A Waves Present] : normal jugular venous A waves present [Normal Jugular Venous V Waves Present] : normal jugular venous V waves present [No Jugular Venous Goodman A Waves] : no jugular venous goodman A waves [Respiration, Rhythm And Depth] : normal respiratory rhythm and effort [Exaggerated Use Of Accessory Muscles For Inspiration] : no accessory muscle use [Auscultation Breath Sounds / Voice Sounds] : lungs were clear to auscultation bilaterally [Heart Rate And Rhythm] : heart rate and rhythm were normal [Heart Sounds] : normal S1 and S2 [Arterial Pulses Normal] : the arterial pulses were normal [Abdomen Soft] : soft [Abdomen Tenderness] : non-tender [Abdomen Mass (___ Cm)] : no abdominal mass palpated [FreeTextEntry1] : .  [Abnormal Walk] : normal gait [Gait - Sufficient For Exercise Testing] : the gait was sufficient for exercise testing [Nail Clubbing] : no clubbing of the fingernails [Cyanosis, Localized] : no localized cyanosis [Petechial Hemorrhages (___cm)] : no petechial hemorrhages [Skin Color & Pigmentation] : normal skin color and pigmentation [] : no rash [No Venous Stasis] : no venous stasis [Skin Lesions] : no skin lesions [No Skin Ulcers] : no skin ulcer [No Xanthoma] : no  xanthoma was observed [Oriented To Time, Place, And Person] : oriented to person, place, and time [Affect] : the affect was normal [Mood] : the mood was normal [No Anxiety] : not feeling anxious

## 2021-03-22 NOTE — HISTORY OF PRESENT ILLNESS
[FreeTextEntry1] : The patient has predominantly left upper quadrant pain when he wakes up which lasts for an hour.  It also occurs in other locations and occurs daily once or twice a day.  It improves with drinking or eating.  He has not seen Dr. Rhodes.His diet has been good.  He had hives after his first Covid injection.  He is unsure if he had them after the second.  He saw dermatology and was prescribed Zyrtec.  His hives resolved.  This occurred in February.

## 2021-03-22 NOTE — DISCUSSION/SUMMARY
[FreeTextEntry1] : The patient is having abdominal pain.  He has had this before.  He had a colonoscopy in 2019.  He declines the gastroenterologist.  He declines a CAT scan.  He will get an abdominal sonogram.  His blood pressure is uncontrolled.  He will start amlodipine 5 mg daily.  His allergy is coming under control.

## 2021-03-26 ENCOUNTER — OUTPATIENT (OUTPATIENT)
Dept: OUTPATIENT SERVICES | Facility: HOSPITAL | Age: 71
LOS: 1 days | End: 2021-03-26
Payer: MEDICARE

## 2021-03-26 ENCOUNTER — APPOINTMENT (OUTPATIENT)
Dept: ULTRASOUND IMAGING | Facility: HOSPITAL | Age: 71
End: 2021-03-26
Payer: MEDICARE

## 2021-03-26 PROCEDURE — 76700 US EXAM ABDOM COMPLETE: CPT

## 2021-03-26 PROCEDURE — 76700 US EXAM ABDOM COMPLETE: CPT | Mod: 26

## 2021-04-28 ENCOUNTER — RX RENEWAL (OUTPATIENT)
Age: 71
End: 2021-04-28

## 2021-05-10 ENCOUNTER — RX RENEWAL (OUTPATIENT)
Age: 71
End: 2021-05-10

## 2021-08-16 NOTE — ED PROVIDER NOTE - CPE EDP EYES NORM
August 16, 2021       Janeen Way DO  [facility] [facility] [address]  Via In Basket      Patient: Channing Pederson   YOB: 1961   Date of Visit: 8/16/2021       Dear Dr. Way:    Thank you for referring Channing Pederson to me for evaluation. Below are my notes for this visit with him.    If you have questions, please do not hesitate to call me. I look forward to following your patient along with you.      Sincerely,        Chanda Mix MD        CC: No Recipients  Chanda Mix MD  8/16/2021  5:10 PM  Signed  CHIEF COMPLAINT  Chief Complaint   Patient presents with   • Diabetes Mellitus     type 2     SUBJECTIVE        HISTORY OF PRESENT ILLNESS    Historic Data: (previously f/u with Dr. Chavez, last seen in 5/2020)  Note from last visit: \"Per His medical record:  Previously seen by Dr Bustamante - last seen by him in Dec 2015 - was advised to f/u 4 months after that appt but never did. On metformin and insulin therapy; with inadequate FSBG checks per the clinic note in Dec 2015. Wore CGM in May 2015 which showed overnight hyperglycemia. First seen by me in Dec 2016 and maintained irregular follow ups. Required treatment for chronic osteomyelitis of the toe after being seen by in June 2019 (did not return until Jan 2020); also noted to have an MI between these two appointment (got RCA SHIVAM in Sept 2019).  Used to work for [employer], now retired.\"      DM history:    Type: Type 2  Diagnosis time: around 1990s;   Most recent HbA1c:  Hemoglobin A1C (%)   Date Value   05/17/2021 9.5 (H)   02/01/2021 9.6 (H)   09/23/2020 9.8 (H)       DM medication related conditions:   GI side effects from MTF: denies;    Pancreatitis history: denies;   Genital fungal infections/recurrent UTIs: denies;   LE amputations or active foot ulcers: denies;       CURRENT VISIT:    Current DM regimen:     F/u with medication management (Shonda Santos, PharmD) (last seen in 7/2021)   Continue Toujeo 70 units  daily.  Continue humalog 60 units with breakfast, lunch and dinner; however,  not eating a lot at that meal inject 45 instead of 60. If BG pre meal 200-250 add 6 units if 251-300 add 10 units.  Continue metformin 1000 mg BID.  Continue Jardiance 25 mg daily.    Home glucose monitoring: (jose francisco 1 using phone)             REVIEW OF SYSTEMS      12 point ROS negative except mention + as below:   GEN: neg  HEENT:  neg  CV:  neg  Respiratory:  neg  GI:  neg  : neg  Hem: neg  MSK:  neg  Skin:  neg  Neuro: neg  Psych:  neg  Endo:  see HPI    Past history reviewed:   Past Medical History:   Diagnosis Date   • Coronary artery disease    • Diabetes mellitus, type 2 (CMS/HCC)    • Essential (primary) hypertension    • Hx of myocardial infarction    • Hyperlipoproteinemia    • Nephrolithiasis    • Obesity      Past Surgical History:   Procedure Laterality Date   • Abdominal hernia repair     • Coronary angioplasty     • Inguinal hernia repair     • Ureterolithotomy       Family History   Problem Relation Age of Onset   • Diabetes Mother          72   • Congestive Heart Failure Mother    • Diabetes Brother    • Other Father          92 from old age   • Cancer, Colon Neg Hx    • Colon Polyps Neg Hx    • Cancer, Esophageal Neg Hx    • Cancer, Liver Neg Hx    • Stomach Cancer Neg Hx    • Cancer, Rectal Neg Hx       Social history reviewed and updated with patient.     Allergies:   No Known Allergies   Medications:   Current Outpatient Medications   Medication Instructions   • aspirin 81 MG tablet Oral, DAILY   • atorvastatin (LIPITOR) 40 mg, Oral, DAILY   • B-D U/F PEN NEEDLE 31G X 5 MM Misc USE AS DIRECTED FOR 4 INJECTIONS PER DAY   • buPROPion XL (WELLBUTRIN XL) 150 MG 24 hr tablet TAKE 1 TABLET BY MOUTH EVERY DAY   • clopidogrel (PLAVIX) 75 mg, Oral, DAILY   • Continuous Blood Gluc  (FreeStyle Jose Francisco 14 Day [place]) Device 1 Units, Intradermal, DAILY   • Continuous Blood Gluc Sensor (FreeStyle Jose Francisco 14 Day Sensor)  Misc 1 applicator, Other, SEE ADMIN INSTRUCTIONS, Change every 14 days   • empagliflozin (JARDIANCE) 25 mg, Oral, DAILY BEFORE BREAKFAST   • insulin lispro (HumaLOG KwikPen) 200 UNIT/ML pen-injector 60 units for a large meal, 50 units for a regular meal, and 30 units for small meals   • latanoprost (XALATAN) 0.005 % ophthalmic solution INT 1 GTT IN OU QHS   • losartan (COZAAR) 25 MG tablet TAKE 1 TABLET BY MOUTH EVERY DAY   • metformin (GLUCOPHAGE) 1,000 mg, Oral, 2 TIMES DAILY   • metoPROLOL tartrate (LOPRESSOR) 25 mg, Oral, EVERY 12 HOURS   • pantoprazole (PROTONIX) 40 MG tablet TAKE 1 TABLET BY MOUTH EVERY DAY   • semaglutide,0.25 or 0.5 mg/DOSE, (OZEMPIC) (1.34 mg/ml) 0.25 or 0.5 MG/DOSE injection Inject under the skin every 7 days, starts with 0.25mg, if tolerates for 4 weeks, increase to 0.5mg once a week.   • Toujeo SoloStar 300 UNIT/ML pen-injector INJECT 60 UNITS AT NIGHT      OBJECTIVE     Vitals:    08/16/21 1510   BP: 134/66   Pulse: 75   Temp: 95.7 °F (35.4 °C)   Weight: 108.9 kg (240 lb)   Height: 5' 10\" (1.778 m)        Physical exam:  GEN: NAD  Eyes: EOMI, conjunctiva normal, no proptosis, no lid lag.  Neck:  Thyroid size normal, no nodules, not tender, no bruit.   Back: no supraclavicular or post cervical fat pads, no acanthosis nigrans  CV: RRR, nl S1, S2, no mrg  Lung: CTAB  Abd: soft, BS+, NT/ND, no subcutaneous nodules palpated around injection sites,  no purple striae noted  Neuro:  Aox3, motor and sensation grossly intact.  DTR 2+  Diabetic Foot exam:  no edema on b/l LE,  pulse DP 2+, no ulcers or wound seen;  Monofilament test: passed on 8/2021;       Labs  Hemoglobin A1C (%)   Date Value   05/17/2021 9.5 (H)   02/01/2021 9.6 (H)     CHOLESTEROL (mg/dL)   Date Value   05/23/2020 135     Cholesterol (mg/dL)   Date Value   05/17/2021 137     HDL (mg/dL)   Date Value   05/17/2021 30 (L)   05/23/2020 27 (L)     CALCULATED LDL (mg/dL)   Date Value   05/23/2020 77     LDL (mg/dL)   Date Value    05/17/2021 70     TRIGLYCERIDE (mg/dL)   Date Value   05/23/2020 157 (H)     Triglycerides (mg/dL)   Date Value   05/17/2021 183 (H)     No results found for: LDLDIR  Creatinine (mg/dL)   Date Value   05/17/2021 0.94   02/01/2021 0.97     MICROALBUMIN, UA (TTL) (mg/dL)   Date Value   09/23/2020 58.30   02/18/2019 11.60     Microalbumin/ Creatinine Ratio (mg/g)   Date Value   05/17/2021 21.5   02/01/2021 58.0 (H)     No results found for: 24PROT  No results found for: 24CREAT  No results found for: CRCL7  ASSESSMENT and PLAN     # Diabetes, Type 2 poorly controlled with hyperglycemia.   most recent A1c:   Hemoglobin A1C (%)   Date Value   05/17/2021 9.5 (H)   02/01/2021 9.6 (H)   09/23/2020 9.8 (H)     MICROVASCULAR DISEASE:  patient denies  retinopathy.  +  Neuropathy (tingling)  patient denies  nephropathy.  MACROVASCULAR DISEASE:  +  MI/CAD s/p PCI in 2019, patient denies   CVA    Weight: 108kg;  BMI 35;    Creatinine:   Creatinine (mg/dL)   Date Value   05/17/2021 0.94       Old regimen:   Continue Toujeo 70 units daily.  Continue humalog 60 units with breakfast, lunch and dinner; however,  not eating a lot at that meal inject 45 instead of 60. If BG pre meal 200-250 add 6 units if 251-300 add 10 units.  Continue metformin 1000 mg BID.  Continue Jardiance 25 mg daily.    Plan  will change DM regimen as below:    - frequent lows due to not eating or eating little after injecting Humalog 60 units (sometimes gets distracted from house work and won't eat until 1 to 2 hour later). Will change to Humalog 60 units for a large meal, 50 units for a regular meal, and 30 units if very mariam and instruct to start eating meals, within 30min of injecting Humalog.     - overnight BG trending up, likely needs more basal insulin Toujeo, but given starting Ozempic, will hold off increase Toujeo for now.     - consider adding GLP-1 RA given hx of CAD and morbid obesity, and high insulin requirement ( units a day)     -  after discussion with patient, will start Ozempic 0.25mg weekly injection x 4 weeks, if tolerates, will increase to 0.5mg weekly.     - Discussed about cardiorenal protective and weight loss benefits of Ozempic. Discussed about side effects of Ozempic (nausea, vomiting, abd pain, diarrhea, acute pancreatitis, medullary thyroid cancer, transient worse of diabetic retinopathy, etc).  Pt denies hx of pancreatitis, no personal or family hx of medullary thyroid cancer.  Instructed if started having vomiting or abd pain, needs to stop and call clinic right away.      - Discussed insulin dose requirement might come down significantly once on Ozempic and needs close monitoring of BG and insulin dose adjustment, will message me once starts on Ozempic.         BG monitoring:   -   has CGM, needs to scan for BG 7 times a day, fasting, premeal, 1 to 2 hour postmeal, bedtime, and whenever feeling BG low.   -  BG target fasting or premeal 80 to 130, and 1 to 2 hour postprandial < 180.   - advised to call me if BG < 70 or > than 300 and not coming down.  - asked to drop off/call in/send via [employer] blood sugar readings in 2 weeks      DM complication screening:   - last diabetic foot exam: 8/16/2021;  Passed monofilament;   - last diabetic eye exam:  Per patient no retinopathy, f/u with Dr. Saldaña   - last diabetic nephropathy screen: UACR neg in 7/2021;   - BP control: BP is at goal.  The patient is on an ace inhibitor/ARB.  (losartan)   - lipids control: The patient is on a statin.(atorva 40mg daily)       DM education today:   - Discussed goals of DM management and systemic complications of chronic uncontrolled diabetes and the importance of DM control in limiting onset and progression of DM end organ injury   - advised to check BG before driving  - discussed symptoms of hypoglycemia, and correct ways of correcting hypoglycemia (\"15-15 rule\"), and avoid over-correcting it.  - educated about cutting down carbohydrate intake,  max  per meal 60grams.  - counseled patient about avoiding concentrated sweets and sugars.  - discussed healthy low-carb snacks  (nuts/vegetables, etc).  - discussed healthy drink choices (no regular soda, juice, or energy drinks).      # Obesity, BMI 34;     Plan  - discussed the goal of total caloric intake per day < 1800 to 2000 kcal for weight loss.  - discussed using calorie/carb counting tools (apps on the phone) to help with limiting carbs and aid weight loss.  - educated about cutting down portion size for meals to aid weight loss  - educated about moderate-intensity aerobic activity at least 50min per session of walking/jogging 3 to 4 times a week.      Follow up in clinic 3 months and check A1c before next visit.     Chanda Mix MD MPH                  normal...

## 2021-09-21 NOTE — REVIEW OF SYSTEMS
[Change In The Stool] : change in stool [Abdominal Pain] : abdominal pain [Negative] : Heme/Lymph Opioid Pregnancy And Lactation Text: These medications can lead to premature delivery and should be avoided during pregnancy. These medications are also present in breast milk in small amounts.

## 2021-09-28 ENCOUNTER — RX RENEWAL (OUTPATIENT)
Age: 71
End: 2021-09-28

## 2021-12-28 ENCOUNTER — NON-APPOINTMENT (OUTPATIENT)
Age: 71
End: 2021-12-28

## 2021-12-28 ENCOUNTER — APPOINTMENT (OUTPATIENT)
Dept: HEART AND VASCULAR | Facility: CLINIC | Age: 71
End: 2021-12-28
Payer: MEDICARE

## 2021-12-28 VITALS
SYSTOLIC BLOOD PRESSURE: 146 MMHG | TEMPERATURE: 96.8 F | BODY MASS INDEX: 24.05 KG/M2 | WEIGHT: 168 LBS | HEART RATE: 70 BPM | HEIGHT: 70 IN | DIASTOLIC BLOOD PRESSURE: 69 MMHG

## 2021-12-28 PROCEDURE — 36415 COLL VENOUS BLD VENIPUNCTURE: CPT

## 2021-12-28 PROCEDURE — 93000 ELECTROCARDIOGRAM COMPLETE: CPT

## 2021-12-28 PROCEDURE — G0439: CPT

## 2021-12-28 NOTE — HISTORY OF PRESENT ILLNESS
[FreeTextEntry1] : The patient has been having right sided abdominal pain.  This resolved a month ago.  In the past he has had left sided abdominal pain.  He walks 1 to 2 hours without difficulty.  He has been on a low carbohydrate diet.  He has not had a recent eye examination.  He has not had a recent colonoscopy.  He had 3 Pfizer vaccines.

## 2021-12-28 NOTE — DISCUSSION/SUMMARY
[FreeTextEntry1] : The abdominal pain has resolved.  Patient is physically active and on improve diet.  He will get an eye examination.  He will call the gastroenterologist about when the next colonoscopy is necessary.  He will get a pneumonia and shingles vaccine.  The EKG done for health maintenance shows ST segment and T wave abnormalities and is unchanged.  The prostate exam is abnormal.  The patient will see the urologist.  Laboratory testing was done.  Blood pressure is uncontrolled.  He will increase his amlodipine and take 10 mg daily.  The patient will contact me what the names of his other providers. He is not having any cognitive impairment. He has no significant current or past depression. He is fully functional and there are no safety issues. He will be screened for eye examination, Colonoscopy  and Immunizations over the next 10 years. The patient was furnished with personalized health advice and does not need referral to health education or preventative counseling services. Patient does not need advanced care planning services.

## 2021-12-28 NOTE — PHYSICAL EXAM
[Well Developed] : well developed [Well Nourished] : well nourished [No Acute Distress] : no acute distress [Normal Conjunctiva] : normal conjunctiva [Normal Venous Pressure] : normal venous pressure [No Carotid Bruit] : no carotid bruit [Normal S1, S2] : normal S1, S2 [No Rub] : no rub [No Gallop] : no gallop [Clear Lung Fields] : clear lung fields [Good Air Entry] : good air entry [No Respiratory Distress] : no respiratory distress  [Soft] : abdomen soft [Non Tender] : non-tender [No Masses/organomegaly] : no masses/organomegaly [Normal Bowel Sounds] : normal bowel sounds [Normal Gait] : normal gait [No Edema] : no edema [No Cyanosis] : no cyanosis [No Clubbing] : no clubbing [No Varicosities] : no varicosities [Normal DP B/L] : normal DP B/L [No Rash] : no rash [No Skin Lesions] : no skin lesions [Moves all extremities] : moves all extremities [No Focal Deficits] : no focal deficits [Normal Speech] : normal speech [Alert and Oriented] : alert and oriented [Normal memory] : normal memory [de-identified] : Reduced hearing [de-identified] : 2/6 systolic ejection murmur [de-identified] : testicular and hernia exams are normal.  The prostate is very large and very firm.

## 2021-12-29 LAB
ALBUMIN SERPL ELPH-MCNC: 4.8 G/DL
ALP BLD-CCNC: 81 U/L
ALT SERPL-CCNC: 23 U/L
ANION GAP SERPL CALC-SCNC: 14 MMOL/L
AST SERPL-CCNC: 23 U/L
BASOPHILS # BLD AUTO: 0.04 K/UL
BASOPHILS NFR BLD AUTO: 0.6 %
BILIRUB DIRECT SERPL-MCNC: 0.2 MG/DL
BILIRUB INDIRECT SERPL-MCNC: 0.5 MG/DL
BILIRUB SERPL-MCNC: 0.7 MG/DL
BUN SERPL-MCNC: 16 MG/DL
CALCIUM SERPL-MCNC: 9.5 MG/DL
CHLORIDE SERPL-SCNC: 104 MMOL/L
CHOLEST SERPL-MCNC: 141 MG/DL
CO2 SERPL-SCNC: 23 MMOL/L
CREAT SERPL-MCNC: 1.1 MG/DL
EOSINOPHIL # BLD AUTO: 0.06 K/UL
EOSINOPHIL NFR BLD AUTO: 0.9 %
ESTIMATED AVERAGE GLUCOSE: 108 MG/DL
FOLATE SERPL-MCNC: 13.2 NG/ML
GLUCOSE SERPL-MCNC: 105 MG/DL
HBA1C MFR BLD HPLC: 5.4 %
HCT VFR BLD CALC: 44.6 %
HDLC SERPL-MCNC: 41 MG/DL
HGB BLD-MCNC: 14.9 G/DL
IMM GRANULOCYTES NFR BLD AUTO: 0.1 %
LDLC SERPL CALC-MCNC: 85 MG/DL
LYMPHOCYTES # BLD AUTO: 1.49 K/UL
LYMPHOCYTES NFR BLD AUTO: 21.6 %
MAN DIFF?: NORMAL
MCHC RBC-ENTMCNC: 31 PG
MCHC RBC-ENTMCNC: 33.4 GM/DL
MCV RBC AUTO: 92.9 FL
MONOCYTES # BLD AUTO: 0.5 K/UL
MONOCYTES NFR BLD AUTO: 7.2 %
NEUTROPHILS # BLD AUTO: 4.81 K/UL
NEUTROPHILS NFR BLD AUTO: 69.6 %
NONHDLC SERPL-MCNC: 100 MG/DL
PLATELET # BLD AUTO: 189 K/UL
POTASSIUM SERPL-SCNC: 4.7 MMOL/L
PROT SERPL-MCNC: 7.1 G/DL
PSA SERPL-MCNC: 6.83 NG/ML
RBC # BLD: 4.8 M/UL
RBC # FLD: 12.5 %
SODIUM SERPL-SCNC: 142 MMOL/L
T4 FREE SERPL-MCNC: 1.2 NG/DL
TRIGL SERPL-MCNC: 75 MG/DL
TSH SERPL-ACNC: 3.38 UIU/ML
VIT B12 SERPL-MCNC: 449 PG/ML
WBC # FLD AUTO: 6.91 K/UL

## 2022-02-14 ENCOUNTER — RX RENEWAL (OUTPATIENT)
Age: 72
End: 2022-02-14

## 2022-02-17 ENCOUNTER — APPOINTMENT (OUTPATIENT)
Dept: HEART AND VASCULAR | Facility: CLINIC | Age: 72
End: 2022-02-17

## 2022-04-21 ENCOUNTER — APPOINTMENT (OUTPATIENT)
Dept: UROLOGY | Facility: CLINIC | Age: 72
End: 2022-04-21
Payer: MEDICARE

## 2022-04-21 VITALS
TEMPERATURE: 98.3 F | HEIGHT: 70 IN | BODY MASS INDEX: 23.91 KG/M2 | RESPIRATION RATE: 18 BRPM | WEIGHT: 167 LBS | DIASTOLIC BLOOD PRESSURE: 74 MMHG | SYSTOLIC BLOOD PRESSURE: 150 MMHG | HEART RATE: 88 BPM

## 2022-04-21 DIAGNOSIS — E78.00 PURE HYPERCHOLESTEROLEMIA, UNSPECIFIED: ICD-10-CM

## 2022-04-21 DIAGNOSIS — Z87.448 PERSONAL HISTORY OF OTHER DISEASES OF URINARY SYSTEM: ICD-10-CM

## 2022-04-21 DIAGNOSIS — Z87.19 PERSONAL HISTORY OF OTHER DISEASES OF THE DIGESTIVE SYSTEM: ICD-10-CM

## 2022-04-21 LAB
BILIRUB UR QL STRIP: NORMAL
CLARITY UR: CLEAR
COLLECTION METHOD: NORMAL
GLUCOSE UR-MCNC: NORMAL
HCG UR QL: 0.2 EU/DL
HGB UR QL STRIP.AUTO: NORMAL
KETONES UR-MCNC: NORMAL
LEUKOCYTE ESTERASE UR QL STRIP: NORMAL
NITRITE UR QL STRIP: NORMAL
PH UR STRIP: 5.5
PROT UR STRIP-MCNC: NORMAL
SP GR UR STRIP: 1.02

## 2022-04-21 PROCEDURE — 81003 URINALYSIS AUTO W/O SCOPE: CPT | Mod: QW

## 2022-04-21 PROCEDURE — 99205 OFFICE O/P NEW HI 60 MIN: CPT

## 2022-04-21 RX ORDER — PNEUMOCOCCAL 13-VALENT CONJUGATE VACCINE 2.2; 2.2; 2.2; 2.2; 2.2; 4.4; 2.2; 2.2; 2.2; 2.2; 2.2; 2.2; 2.2 UG/.5ML; UG/.5ML; UG/.5ML; UG/.5ML; UG/.5ML; UG/.5ML; UG/.5ML; UG/.5ML; UG/.5ML; UG/.5ML; UG/.5ML; UG/.5ML; UG/.5ML
INJECTION, SUSPENSION INTRAMUSCULAR
Qty: 1 | Refills: 0 | Status: DISCONTINUED | COMMUNITY
Start: 2020-11-03 | End: 2022-04-21

## 2022-04-21 NOTE — ASSESSMENT
[FreeTextEntry1] : I had a comprehensive discussion with Mr. Dwayne Delgado outlining the findings and options for the newly diagnosed prostate cancer, including active surveillance, surgery, radiation therapy (traditional, stereotactic, brachytherapy), cryosurgery, and partial gland/focal ablation.  I provided him with the risks and benefits of each approach,  and have given him a copy of his records.  Going forward, I recommended that he consider other opinions including a consultation with a radiation oncologist, and I have referred him to Dr. Henrique Schwartz.\par (Mr. Delgado did not want to be examined).\par \par If he chooses active surveillance (which I advised against) he should consider genomic testing (e.g. Oncotype DX, Decipher, or Prolaris). The accuracy of these tests is limited by the small tissue samples, tumor heterogeneity and sampling error. \par \par The incidentally detected small left renal mass should be evaluated in six months with a renal sonogram at [facility] (and compared to the CT scan images).\par \par Finally, I have asked Mr. Delgado to please obtain the prior urologic records (including PSAs from [id number] and the pathology reports from his prior biopsies).

## 2022-04-21 NOTE — LETTER BODY
[Dear  ___] : Dear  [unfilled], [Consult Letter:] : I had the pleasure of evaluating your patient, [unfilled]. [Please see my note below.] : Please see my note below. [Consult Closing:] : Thank you very much for allowing me to participate in the care of this patient.  If you have any questions, please do not hesitate to contact me. [Sincerely,] : Sincerely, [FreeTextEntry3] : Jude Licona MD, FACS

## 2022-04-21 NOTE — ADDENDUM
[FreeTextEntry1] : A portion of this note was written by [Juan Hinton] on 04/21/2022 acting as a scribe for Dr. Licona. \par \par I have personally reviewed the chart and agree that the record accurately reflects my personal performance of the history, physical exam, assessment, and plan.

## 2022-04-21 NOTE — PHYSICAL EXAM
[General Appearance - Well Developed] : well developed [General Appearance - Well Nourished] : well nourished [Normal Appearance] : normal appearance [Well Groomed] : well groomed [General Appearance - In No Acute Distress] : no acute distress [Oriented To Time, Place, And Person] : oriented to person, place, and time [Affect] : the affect was normal [Not Anxious] : not anxious

## 2022-04-21 NOTE — HISTORY OF PRESENT ILLNESS
[FreeTextEntry1] : Mr. EMERALD RUIZ comes in today for a urologic evaluation and second opinion.  Due to an elevated PSA, Mr. Ruiz underwent a prostate biopsy on March 15, 2022, which identified Grade Group 2 prostatic adenocarcinoma (see below). He denies any systemic complaints. He has had two prior biopsies, both reportedly negative (records not available). \par \par From his general urologic history, Mr. Ruiz reports mild stable lower urinary tract symptoms, with nocturia x 1-2. He had a TURP in approximately 2012 to address urinary retention and a laser prostatectomy in approximately 2002. Prior to these procedures he had taken Flomax. Mr. Ruiz is continuing on dutasteride (which he has been on long-term). \par IPSS: 11/35\par \par Mr. Ruiz has a history of urolithiasis with three prior episodes of renal colic (most recently in 2007). He has been asymptomatic since. He is continuing on potassium citrate and allopurinol. \par \par His erectile function is reportedly diminished, however, he is not interested in treatment for this at this time. \par \par PSAs: 1/20/22--6.47;  12/28/21--6.83; 9/25/17--3.52; 11/25/15--2.75; \par \par Pathology (Prostate Biopsy): 3/15/22--Grade Group 2 prostatic adenocarcinoma (Ralph 3+4) in 3 cores with a maximum of 75% tumor involvement. Grade Group 1 (Ralph 3+3) in 2 cores involving <5% of the tissue; \par \par CT: 4/4/22--1cm enhancing lesion in the medial lower pole of the left kidney which may represent a small renal cell carcinoma. Recommend MRI for further evaluation. No lymphadenopathy or periprostatic infiltration. Small fat-containing right inguinal hernia. Tiny sclerotic lesions in vertebral bodies L4 and L5 which likely represent bone islands; \par \par Bone Scan: 4/18/22--No scintigraphic evidence of osseous metastases; \par \par MRI: 1/21/22--0.8 x 0.8cm lesion in the right posteromedial midgland peripheral zone suspicious for prostate cancer. PI-RADS 4. Prostate 79cc. No extraprostatic extension or seminal vesicle invasion; \par \par US Abd: 2/8/22--Gallbladder absent. Otherwise unremarkable; 3/26/21--Gallbladder absent;

## 2022-04-26 ENCOUNTER — APPOINTMENT (OUTPATIENT)
Dept: HEART AND VASCULAR | Facility: CLINIC | Age: 72
End: 2022-04-26

## 2022-05-05 ENCOUNTER — APPOINTMENT (OUTPATIENT)
Dept: UROLOGY | Facility: CLINIC | Age: 72
End: 2022-05-05

## 2022-06-13 ENCOUNTER — APPOINTMENT (OUTPATIENT)
Dept: HEART AND VASCULAR | Facility: CLINIC | Age: 72
End: 2022-06-13
Payer: MEDICARE

## 2022-06-13 ENCOUNTER — APPOINTMENT (OUTPATIENT)
Dept: HEART AND VASCULAR | Facility: CLINIC | Age: 72
End: 2022-06-13

## 2022-06-13 ENCOUNTER — NON-APPOINTMENT (OUTPATIENT)
Age: 72
End: 2022-06-13

## 2022-06-13 VITALS
HEIGHT: 70 IN | SYSTOLIC BLOOD PRESSURE: 135 MMHG | WEIGHT: 171 LBS | DIASTOLIC BLOOD PRESSURE: 72 MMHG | HEART RATE: 66 BPM | BODY MASS INDEX: 24.48 KG/M2 | TEMPERATURE: 97.8 F

## 2022-06-13 VITALS — SYSTOLIC BLOOD PRESSURE: 125 MMHG | DIASTOLIC BLOOD PRESSURE: 68 MMHG

## 2022-06-13 DIAGNOSIS — Z87.898 PERSONAL HISTORY OF OTHER SPECIFIED CONDITIONS: ICD-10-CM

## 2022-06-13 PROCEDURE — 99214 OFFICE O/P EST MOD 30 MIN: CPT | Mod: 25

## 2022-06-13 PROCEDURE — 93000 ELECTROCARDIOGRAM COMPLETE: CPT | Mod: NC,59

## 2022-06-13 PROCEDURE — 93015 CV STRESS TEST SUPVJ I&R: CPT

## 2022-06-13 PROCEDURE — 36415 COLL VENOUS BLD VENIPUNCTURE: CPT

## 2022-06-13 PROCEDURE — ZZZZZ: CPT

## 2022-06-14 PROBLEM — Z87.898 HISTORY OF ELEVATED PROSTATE SPECIFIC ANTIGEN (PSA): Status: RESOLVED | Noted: 2022-04-21 | Resolved: 2022-06-14

## 2022-06-14 LAB
ALBUMIN SERPL ELPH-MCNC: 5.1 G/DL
ALP BLD-CCNC: 77 U/L
ALT SERPL-CCNC: 20 U/L
ANION GAP SERPL CALC-SCNC: 16 MMOL/L
APPEARANCE: CLEAR
APTT BLD: 31.2 SEC
AST SERPL-CCNC: 18 U/L
BASOPHILS # BLD AUTO: 0.05 K/UL
BASOPHILS NFR BLD AUTO: 0.6 %
BILIRUB SERPL-MCNC: 0.7 MG/DL
BILIRUBIN URINE: NEGATIVE
BLOOD URINE: NEGATIVE
BUN SERPL-MCNC: 16 MG/DL
CALCIUM SERPL-MCNC: 9.8 MG/DL
CHLORIDE SERPL-SCNC: 104 MMOL/L
CHOLEST SERPL-MCNC: 162 MG/DL
CO2 SERPL-SCNC: 23 MMOL/L
COLOR: YELLOW
CREAT SERPL-MCNC: 1.17 MG/DL
EGFR: 66 ML/MIN/1.73M2
EOSINOPHIL # BLD AUTO: 0.11 K/UL
EOSINOPHIL NFR BLD AUTO: 1.3 %
GLUCOSE QUALITATIVE U: NEGATIVE
GLUCOSE SERPL-MCNC: 102 MG/DL
HCT VFR BLD CALC: 43.7 %
HDLC SERPL-MCNC: 40 MG/DL
HGB BLD-MCNC: 14.6 G/DL
IMM GRANULOCYTES NFR BLD AUTO: 0.2 %
INR PPP: 0.98 RATIO
KETONES URINE: NEGATIVE
LDLC SERPL CALC-MCNC: 95 MG/DL
LEUKOCYTE ESTERASE URINE: NEGATIVE
LYMPHOCYTES # BLD AUTO: 2.1 K/UL
LYMPHOCYTES NFR BLD AUTO: 25.1 %
MAN DIFF?: NORMAL
MCHC RBC-ENTMCNC: 30.2 PG
MCHC RBC-ENTMCNC: 33.4 GM/DL
MCV RBC AUTO: 90.5 FL
MONOCYTES # BLD AUTO: 0.63 K/UL
MONOCYTES NFR BLD AUTO: 7.5 %
NEUTROPHILS # BLD AUTO: 5.46 K/UL
NEUTROPHILS NFR BLD AUTO: 65.3 %
NITRITE URINE: NEGATIVE
NONHDLC SERPL-MCNC: 122 MG/DL
PH URINE: 5
PLATELET # BLD AUTO: 187 K/UL
POTASSIUM SERPL-SCNC: 4.4 MMOL/L
PROT SERPL-MCNC: 7.6 G/DL
PROTEIN URINE: NORMAL
PT BLD: 11.6 SEC
RBC # BLD: 4.83 M/UL
RBC # FLD: 13 %
SODIUM SERPL-SCNC: 143 MMOL/L
SPECIFIC GRAVITY URINE: 1.02
TRIGL SERPL-MCNC: 134 MG/DL
TSH SERPL-ACNC: 4.75 UIU/ML
UROBILINOGEN URINE: NORMAL
WBC # FLD AUTO: 8.37 K/UL

## 2022-06-14 RX ORDER — CIPROFLOXACIN HYDROCHLORIDE 500 MG/1
500 TABLET, FILM COATED ORAL
Qty: 6 | Refills: 0 | Status: DISCONTINUED | COMMUNITY
Start: 2022-02-15

## 2022-06-14 NOTE — HISTORY OF PRESENT ILLNESS
[FreeTextEntry1] : The patient is having a prostatectomy for prostate cancer on July 6 with Dr. Jason Michele.  The patient walks 1 to 2 hours without difficulty.  He has no unusual bleeding clotting or difficulty with anesthesia.  He has not had any chest discomfort.

## 2022-06-15 LAB — BACTERIA UR CULT: NORMAL

## 2022-06-20 ENCOUNTER — RX RENEWAL (OUTPATIENT)
Age: 72
End: 2022-06-20

## 2022-06-28 ENCOUNTER — APPOINTMENT (OUTPATIENT)
Dept: HEART AND VASCULAR | Facility: CLINIC | Age: 72
End: 2022-06-28

## 2022-07-28 ENCOUNTER — APPOINTMENT (OUTPATIENT)
Dept: HEART AND VASCULAR | Facility: CLINIC | Age: 72
End: 2022-07-28

## 2022-07-28 ENCOUNTER — NON-APPOINTMENT (OUTPATIENT)
Age: 72
End: 2022-07-28

## 2022-07-28 VITALS
OXYGEN SATURATION: 97 % | BODY MASS INDEX: 24.18 KG/M2 | WEIGHT: 167 LBS | HEART RATE: 67 BPM | DIASTOLIC BLOOD PRESSURE: 72 MMHG | HEIGHT: 69.5 IN | SYSTOLIC BLOOD PRESSURE: 135 MMHG | TEMPERATURE: 95.8 F

## 2022-07-28 PROCEDURE — 93000 ELECTROCARDIOGRAM COMPLETE: CPT

## 2022-07-28 PROCEDURE — 99214 OFFICE O/P EST MOD 30 MIN: CPT | Mod: 25

## 2022-07-28 PROCEDURE — 93306 TTE W/DOPPLER COMPLETE: CPT

## 2022-07-28 RX ORDER — LEVOFLOXACIN 500 MG/1
500 TABLET, FILM COATED ORAL
Qty: 1 | Refills: 0 | Status: DISCONTINUED | COMMUNITY
Start: 2022-06-29

## 2022-07-28 RX ORDER — LIDOCAINE AND PRILOCAINE 25; 25 MG/G; MG/G
2.5-2.5 CREAM TOPICAL
Qty: 30 | Refills: 0 | Status: DISCONTINUED | COMMUNITY
Start: 2022-06-29

## 2022-07-28 RX ORDER — IBUPROFEN 600 MG/1
600 TABLET, FILM COATED ORAL
Qty: 30 | Refills: 0 | Status: DISCONTINUED | COMMUNITY
Start: 2022-06-29

## 2022-07-28 RX ORDER — DOCUSATE SODIUM 100 MG/1
100 CAPSULE, LIQUID FILLED ORAL
Qty: 30 | Refills: 0 | Status: DISCONTINUED | COMMUNITY
Start: 2022-06-29

## 2022-07-28 NOTE — PHYSICAL EXAM
[Well Developed] : well developed [Well Nourished] : well nourished [No Acute Distress] : no acute distress [Normal Conjunctiva] : normal conjunctiva [Normal Venous Pressure] : normal venous pressure [No Carotid Bruit] : no carotid bruit [Normal S1, S2] : normal S1, S2 [No Rub] : no rub [No Gallop] : no gallop [Clear Lung Fields] : clear lung fields [Good Air Entry] : good air entry [No Respiratory Distress] : no respiratory distress  [Soft] : abdomen soft [Non Tender] : non-tender [No Masses/organomegaly] : no masses/organomegaly [Normal Bowel Sounds] : normal bowel sounds [Normal Gait] : normal gait [No Edema] : no edema [No Cyanosis] : no cyanosis [No Clubbing] : no clubbing [No Varicosities] : no varicosities [Normal DP B/L] : normal DP B/L [No Rash] : no rash [No Skin Lesions] : no skin lesions [Moves all extremities] : moves all extremities [No Focal Deficits] : no focal deficits [Normal Speech] : normal speech [Alert and Oriented] : alert and oriented [Normal memory] : normal memory [de-identified] : 2/6 systolic ejection murmur

## 2022-07-28 NOTE — HISTORY OF PRESENT ILLNESS
[FreeTextEntry1] : Patient had a prostatectomy for prostate cancer.  A follow-up sonogram is planned for his left kidney lesion.  He walks for an hour occasionally without difficulty.  He has had recurrence of his left chest discomfort lasting 10-15 seconds and occurring when bending over.

## 2022-07-28 NOTE — DISCUSSION/SUMMARY
[FreeTextEntry1] : The patient had a prostatectomy for prostate cancer.  He will get an ultrasound to follow-up his left kidney lesion.  He has recurrence of his chest discomfort.  It is atypical.  EKG shows mild ST depression and is unchanged.  The echocardiogram reveals an ejection fraction of 65% without significant valvular disease.  Significant CAD is unlikely.  Chest pain is noncardiac.Blood pressure is slightly high and not fully controlled but has been improving.  He will attempt to improve his diet.  He will continue on lisinopril.

## 2022-08-29 ENCOUNTER — NON-APPOINTMENT (OUTPATIENT)
Age: 72
End: 2022-08-29

## 2022-10-25 ENCOUNTER — RX RENEWAL (OUTPATIENT)
Age: 72
End: 2022-10-25

## 2022-12-02 ENCOUNTER — APPOINTMENT (OUTPATIENT)
Dept: HEART AND VASCULAR | Facility: CLINIC | Age: 72
End: 2022-12-02

## 2022-12-02 ENCOUNTER — EMERGENCY (EMERGENCY)
Facility: HOSPITAL | Age: 72
LOS: 1 days | Discharge: HOME | End: 2022-12-02
Attending: EMERGENCY MEDICINE | Admitting: EMERGENCY MEDICINE
Payer: MEDICARE

## 2022-12-02 VITALS
DIASTOLIC BLOOD PRESSURE: 72 MMHG | TEMPERATURE: 98 F | WEIGHT: 166.89 LBS | HEART RATE: 82 BPM | HEIGHT: 70 IN | OXYGEN SATURATION: 97 % | RESPIRATION RATE: 18 BRPM | SYSTOLIC BLOOD PRESSURE: 137 MMHG

## 2022-12-02 VITALS
HEART RATE: 73 BPM | WEIGHT: 167 LBS | BODY MASS INDEX: 23.91 KG/M2 | DIASTOLIC BLOOD PRESSURE: 74 MMHG | SYSTOLIC BLOOD PRESSURE: 132 MMHG | OXYGEN SATURATION: 98 % | HEIGHT: 70 IN

## 2022-12-02 VITALS
RESPIRATION RATE: 18 BRPM | TEMPERATURE: 98 F | SYSTOLIC BLOOD PRESSURE: 126 MMHG | DIASTOLIC BLOOD PRESSURE: 72 MMHG | OXYGEN SATURATION: 98 % | HEART RATE: 62 BPM

## 2022-12-02 DIAGNOSIS — N40.0 BENIGN PROSTATIC HYPERPLASIA WITHOUT LOWER URINARY TRACT SYMPTOMS: ICD-10-CM

## 2022-12-02 DIAGNOSIS — Z90.49 ACQUIRED ABSENCE OF OTHER SPECIFIED PARTS OF DIGESTIVE TRACT: ICD-10-CM

## 2022-12-02 DIAGNOSIS — R07.89 OTHER CHEST PAIN: ICD-10-CM

## 2022-12-02 DIAGNOSIS — E03.9 HYPOTHYROIDISM, UNSPECIFIED: ICD-10-CM

## 2022-12-02 DIAGNOSIS — Z20.822 CONTACT WITH AND (SUSPECTED) EXPOSURE TO COVID-19: ICD-10-CM

## 2022-12-02 DIAGNOSIS — E78.00 PURE HYPERCHOLESTEROLEMIA, UNSPECIFIED: ICD-10-CM

## 2022-12-02 DIAGNOSIS — Z87.19 PERSONAL HISTORY OF OTHER DISEASES OF THE DIGESTIVE SYSTEM: ICD-10-CM

## 2022-12-02 DIAGNOSIS — M10.9 GOUT, UNSPECIFIED: ICD-10-CM

## 2022-12-02 DIAGNOSIS — I10 ESSENTIAL (PRIMARY) HYPERTENSION: ICD-10-CM

## 2022-12-02 DIAGNOSIS — Z87.442 PERSONAL HISTORY OF URINARY CALCULI: ICD-10-CM

## 2022-12-02 LAB
ALBUMIN SERPL ELPH-MCNC: 4.7 G/DL — SIGNIFICANT CHANGE UP (ref 3.3–5)
ALP SERPL-CCNC: 80 U/L — SIGNIFICANT CHANGE UP (ref 40–120)
ALT FLD-CCNC: 18 U/L — SIGNIFICANT CHANGE UP (ref 10–45)
ANION GAP SERPL CALC-SCNC: 9 MMOL/L — SIGNIFICANT CHANGE UP (ref 5–17)
APTT BLD: 29.9 SEC — SIGNIFICANT CHANGE UP (ref 27.5–35.5)
AST SERPL-CCNC: 24 U/L — SIGNIFICANT CHANGE UP (ref 10–40)
BASOPHILS # BLD AUTO: 0.05 K/UL — SIGNIFICANT CHANGE UP (ref 0–0.2)
BASOPHILS NFR BLD AUTO: 0.6 % — SIGNIFICANT CHANGE UP (ref 0–2)
BILIRUB SERPL-MCNC: 0.6 MG/DL — SIGNIFICANT CHANGE UP (ref 0.2–1.2)
BUN SERPL-MCNC: 18 MG/DL — SIGNIFICANT CHANGE UP (ref 7–23)
CALCIUM SERPL-MCNC: 9.7 MG/DL — SIGNIFICANT CHANGE UP (ref 8.4–10.5)
CHLORIDE SERPL-SCNC: 103 MMOL/L — SIGNIFICANT CHANGE UP (ref 96–108)
CO2 SERPL-SCNC: 27 MMOL/L — SIGNIFICANT CHANGE UP (ref 22–31)
CREAT SERPL-MCNC: 1.11 MG/DL — SIGNIFICANT CHANGE UP (ref 0.5–1.3)
EGFR: 71 ML/MIN/1.73M2 — SIGNIFICANT CHANGE UP
EOSINOPHIL # BLD AUTO: 0.06 K/UL — SIGNIFICANT CHANGE UP (ref 0–0.5)
EOSINOPHIL NFR BLD AUTO: 0.7 % — SIGNIFICANT CHANGE UP (ref 0–6)
GLUCOSE SERPL-MCNC: 130 MG/DL — HIGH (ref 70–99)
HCT VFR BLD CALC: 42.6 % — SIGNIFICANT CHANGE UP (ref 39–50)
HGB BLD-MCNC: 14.6 G/DL — SIGNIFICANT CHANGE UP (ref 13–17)
IMM GRANULOCYTES NFR BLD AUTO: 0.2 % — SIGNIFICANT CHANGE UP (ref 0–0.9)
INR BLD: 1.05 — SIGNIFICANT CHANGE UP (ref 0.88–1.16)
LYMPHOCYTES # BLD AUTO: 1.46 K/UL — SIGNIFICANT CHANGE UP (ref 1–3.3)
LYMPHOCYTES # BLD AUTO: 17.8 % — SIGNIFICANT CHANGE UP (ref 13–44)
MAGNESIUM SERPL-MCNC: 2.2 MG/DL — SIGNIFICANT CHANGE UP (ref 1.6–2.6)
MCHC RBC-ENTMCNC: 30.4 PG — SIGNIFICANT CHANGE UP (ref 27–34)
MCHC RBC-ENTMCNC: 34.3 GM/DL — SIGNIFICANT CHANGE UP (ref 32–36)
MCV RBC AUTO: 88.6 FL — SIGNIFICANT CHANGE UP (ref 80–100)
MONOCYTES # BLD AUTO: 0.61 K/UL — SIGNIFICANT CHANGE UP (ref 0–0.9)
MONOCYTES NFR BLD AUTO: 7.5 % — SIGNIFICANT CHANGE UP (ref 2–14)
NEUTROPHILS # BLD AUTO: 5.98 K/UL — SIGNIFICANT CHANGE UP (ref 1.8–7.4)
NEUTROPHILS NFR BLD AUTO: 73.2 % — SIGNIFICANT CHANGE UP (ref 43–77)
NRBC # BLD: 0 /100 WBCS — SIGNIFICANT CHANGE UP (ref 0–0)
NT-PROBNP SERPL-SCNC: 9 PG/ML — SIGNIFICANT CHANGE UP (ref 0–300)
PLATELET # BLD AUTO: 201 K/UL — SIGNIFICANT CHANGE UP (ref 150–400)
POTASSIUM SERPL-MCNC: 4.3 MMOL/L — SIGNIFICANT CHANGE UP (ref 3.5–5.3)
POTASSIUM SERPL-SCNC: 4.3 MMOL/L — SIGNIFICANT CHANGE UP (ref 3.5–5.3)
PROT SERPL-MCNC: 7.7 G/DL — SIGNIFICANT CHANGE UP (ref 6–8.3)
PROTHROM AB SERPL-ACNC: 12.5 SEC — SIGNIFICANT CHANGE UP (ref 10.5–13.4)
RBC # BLD: 4.81 M/UL — SIGNIFICANT CHANGE UP (ref 4.2–5.8)
RBC # FLD: 12.9 % — SIGNIFICANT CHANGE UP (ref 10.3–14.5)
SARS-COV-2 RNA SPEC QL NAA+PROBE: NEGATIVE — SIGNIFICANT CHANGE UP
SODIUM SERPL-SCNC: 139 MMOL/L — SIGNIFICANT CHANGE UP (ref 135–145)
TROPONIN T SERPL-MCNC: 0.01 NG/ML — SIGNIFICANT CHANGE UP (ref 0–0.01)
TROPONIN T SERPL-MCNC: <0.01 NG/ML — SIGNIFICANT CHANGE UP (ref 0–0.01)
WBC # BLD: 8.18 K/UL — SIGNIFICANT CHANGE UP (ref 3.8–10.5)
WBC # FLD AUTO: 8.18 K/UL — SIGNIFICANT CHANGE UP (ref 3.8–10.5)

## 2022-12-02 PROCEDURE — 75574 CT ANGIO HRT W/3D IMAGE: CPT | Mod: 26,MA

## 2022-12-02 PROCEDURE — 84484 ASSAY OF TROPONIN QUANT: CPT

## 2022-12-02 PROCEDURE — 93010 ELECTROCARDIOGRAM REPORT: CPT

## 2022-12-02 PROCEDURE — 36415 COLL VENOUS BLD VENIPUNCTURE: CPT

## 2022-12-02 PROCEDURE — 93005 ELECTROCARDIOGRAM TRACING: CPT | Mod: XU

## 2022-12-02 PROCEDURE — 87635 SARS-COV-2 COVID-19 AMP PRB: CPT

## 2022-12-02 PROCEDURE — 99284 EMERGENCY DEPT VISIT MOD MDM: CPT | Mod: CS

## 2022-12-02 PROCEDURE — 85025 COMPLETE CBC W/AUTO DIFF WBC: CPT

## 2022-12-02 PROCEDURE — 83735 ASSAY OF MAGNESIUM: CPT

## 2022-12-02 PROCEDURE — 83880 ASSAY OF NATRIURETIC PEPTIDE: CPT

## 2022-12-02 PROCEDURE — 85610 PROTHROMBIN TIME: CPT

## 2022-12-02 PROCEDURE — 99215 OFFICE O/P EST HI 40 MIN: CPT

## 2022-12-02 PROCEDURE — 80053 COMPREHEN METABOLIC PANEL: CPT

## 2022-12-02 PROCEDURE — 85730 THROMBOPLASTIN TIME PARTIAL: CPT

## 2022-12-02 PROCEDURE — 75574 CT ANGIO HRT W/3D IMAGE: CPT | Mod: MA

## 2022-12-02 PROCEDURE — 93000 ELECTROCARDIOGRAM COMPLETE: CPT

## 2022-12-02 PROCEDURE — 71045 X-RAY EXAM CHEST 1 VIEW: CPT

## 2022-12-02 PROCEDURE — 71045 X-RAY EXAM CHEST 1 VIEW: CPT | Mod: 26

## 2022-12-02 PROCEDURE — 99285 EMERGENCY DEPT VISIT HI MDM: CPT | Mod: 25

## 2022-12-02 RX ORDER — HYDROCORTISONE 25 MG/ML
2.5 LOTION TOPICAL
Qty: 59 | Refills: 0 | Status: DISCONTINUED | COMMUNITY
Start: 2022-08-26

## 2022-12-02 RX ORDER — CLOTRIMAZOLE 10 MG/G
1 CREAM TOPICAL
Qty: 30 | Refills: 0 | Status: DISCONTINUED | COMMUNITY
Start: 2022-08-29

## 2022-12-02 RX ORDER — ASPIRIN/CALCIUM CARB/MAGNESIUM 324 MG
325 TABLET ORAL ONCE
Refills: 0 | Status: COMPLETED | OUTPATIENT
Start: 2022-12-02 | End: 2022-12-02

## 2022-12-02 RX ORDER — METOPROLOL TARTRATE 50 MG
50 TABLET ORAL ONCE
Refills: 0 | Status: COMPLETED | OUTPATIENT
Start: 2022-12-02 | End: 2022-12-02

## 2022-12-02 RX ADMIN — Medication 325 MILLIGRAM(S): at 09:49

## 2022-12-02 RX ADMIN — Medication 50 MILLIGRAM(S): at 09:48

## 2022-12-02 NOTE — ED ADULT NURSE NOTE - NSIMPLEMENTINTERV_GEN_ALL_ED
Implemented All Universal Safety Interventions:  [place] to call system. Call bell, personal items and telephone within reach. Instruct patient to call for assistance. Room bathroom lighting operational. Non-slip footwear when patient is off stretcher. Physically safe environment: no spills, clutter or unnecessary equipment. Stretcher in lowest position, wheels locked, appropriate side rails in place.

## 2022-12-02 NOTE — ED PROVIDER NOTE - PROGRESS NOTE DETAILS
Chest pain free during entire ED stay, discussed with Dr. Calix, given non obstructive disease, will have pt fu in office, rpt trop negative, strict return prec given. Chest pain free during entire ED stay, discussed with Dr. Calix, given non obstructive disease and has had abnormal ekg in past, will have pt fu in office, Wanda w Dr. Gaines, last part of study regarding flow will discuss with Dr. Calix and can fu up that part of study as outpt, rpt trop negative, strict return prec given. pt updated on incidental findings.

## 2022-12-02 NOTE — ED ADULT TRIAGE NOTE - CHIEF COMPLAINT QUOTE
Pt presents to ED c/o midsternal chest pain since last night. hx of high blood pressure and high cholesterol. 12 lead EKG done. denies sob, fevers, chills, dizziness.

## 2022-12-02 NOTE — ED PROVIDER NOTE - PATIENT PORTAL LINK FT
You can access the FollowMyHealth Patient Portal offered by [facility] by registering at the following website: [email or web address]. By joining "[employer]’s FollowMyHealth portal, you will also be able to view your health information using other applications (apps) compatible with our system.

## 2022-12-02 NOTE — ED PROVIDER NOTE - CARE PROVIDER_API CALL
Laurence Calix)  Cardiovascular Disease; Internal Medicine  [address], [address] [place] [address]  Phone: [phone]  Fax: [phone]  Follow Up Time:

## 2022-12-02 NOTE — ED PROVIDER NOTE - OBJECTIVE STATEMENT
73 yo PMHx of htn, hdl, gallstone pancreatitis s/p cholecystectomy, nephrolithiasis (last episode 10 yrs ago, no intervention required), BPH, gout, w complaints of chest pain for one month, intermittent, but rpt episode last night across chest, non radiating, Denies associated SOB, NVD, lightheaded, diaphoresis, palpitations, cough/rhinorrhea,  LE pain/swelling, focal weakness/numbness, CP non radiating, not associated with inspiration, position, exertion or food consumption. Had stress test 5 months ago wnl. No ac use.

## 2022-12-02 NOTE — ED PROVIDER NOTE - CLINICAL SUMMARY MEDICAL DECISION MAKING FREE TEXT BOX
73 yo PMHx of htn, hdl, gallstone pancreatitis s/p cholecystectomy, nephrolithiasis (last episode 10 yrs ago, no intervention required), BPH, gout, w complaints of chest pain for one month, intermittent, but rpt episode last night across chest, non radiating, Denies associated SOB, NVD, lightheaded, diaphoresis, palpitations, cough/rhinorrhea,  LE pain/swelling, focal weakness/numbness, CP non radiating, not associated with inspiration, position, exertion or food consumption. Had stress test 5 months ago wnl. No ac use. hds, no active chest pain  Ddx: ACS, less likely acute resp/GI etiology given no assoc complaints  pending labs, CT cardiac, disc w Dr. Calix

## 2022-12-02 NOTE — ED ADULT NURSE NOTE - OBJECTIVE STATEMENT
72 y.o male c/o midsternal cp x yesterday. Pt states pain comes for 15 seconds then goes away. Denies numbness/tingling, jaw pain, headache, dizziness, sob.

## 2022-12-02 NOTE — ED PROVIDER NOTE - NSFOLLOWUPINSTRUCTIONS_ED_ALL_ED_FT
Can take tylenol 650mg every 6hrs as needed for pain.  Follow up with primary doctor within 1-2 days.  Follow up with cardiologist within 1-2 days. Can call [phone] ([facility]) to schedule appointment.   Return to ER for persistent fever/vomit, uncontrolled pain, worsening breathing, worsening lightheaded, focal weakness/numbness.    Nonspecific Chest Pain  Chest pain can be caused by many different conditions. It can be caused by a condition that is life-threatening and requires treatment right away. It can also be caused by something that is not life-threatening. If you have chest pain, it can be hard to know the difference, so it is important to get help right away to make sure that you do not have a serious condition.    Some life-threatening causes of chest pain include:  Heart attack.  A tear in the body's main blood vessel (aortic dissection).  Inflammation around your heart (pericarditis).  A problem in the lungs, such as a blood clot (pulmonary embolism) or a collapsed lung (pneumothorax).    Some non life-threatening causes of chest pain include:  Heartburn.  Anxiety or stress.  Damage to the bones, muscles, and cartilage that make up your chest wall.  Pneumonia or bronchitis.  Shingles infection (varicella-zoster virus).    Chest pain can feel like:  Pain or discomfort on the surface of your chest or deep in your chest.  Crushing, pressure, aching, or squeezing pain.  Burning or tingling.  Dull or sharp pain that is worse when you move, cough, or take a deep breath.  Pain or discomfort that is also felt in your back, neck, jaw, shoulder, or arm, or pain that spreads to any of these areas.  Your chest pain may come and go. It may also be constant. Your health care provider will do lab tests and other studies to find the cause of your pain. Treatment will depend on the cause of your chest pain.    Follow these instructions at home:  Pay attention to any changes in your symptoms. Tell your health care provider about them or any new symptoms. Follow these instructions from your health care provider.    Medicines   Take over-the-counter and prescription medicines only as told by your health care provider.  If you were prescribed an antibiotic, take it as told by your health care provider. Do not stop taking the antibiotic even if you start to feel better.    Lifestyle    Rest as directed by your health care provider.  Do not use any products that contain nicotine or tobacco, such as cigarettes and e-cigarettes. If you need help quitting, ask your health care provider.  Do not drink alcohol.  Make healthy lifestyle choices as recommended. These may include:  Getting regular exercise. Ask your health care provider to suggest some activities that are safe for you.  Eating a heart-healthy diet. This includes plenty of fresh fruits and vegetables, whole grains, low-fat (lean) protein, and low-fat dairy products. A dietitian can help you find healthy eating options.  Maintaining a healthy weight.  Managing any other health conditions you have, such as hypertension or diabetes.  Reducing stress, such as with yoga or relaxation techniques.    General instructions   Avoid any activities that bring on chest pain.  Keep all follow-up visits as told by your health care provider. This is important. This includes visits for any further testing if your chest pain does not go away.    Contact a health care provider if:  Your chest pain does not go away.  You feel depressed.  You have a fever.    Get help right away if:  Your chest pain gets worse.  You have a cough that gets worse, or you cough up blood.  You have severe pain in your abdomen.  You faint.  You have sudden, unexplained chest discomfort.  You have sudden, unexplained discomfort in your arms, back, neck, or jaw.  You have shortness of breath at any time.  You suddenly start to sweat, or your skin gets clammy.  You feel nausea or you vomit.  You suddenly feel lightheaded or dizzy.  You have severe weakness, or unexplained weakness or fatigue.  Your heart begins to beat quickly, or it feels like it is skipping beats.

## 2022-12-03 NOTE — DISCUSSION/SUMMARY
[FreeTextEntry1] : The patient has new severe chest pain which has exertional component.  His EKG is abnormal but unchanged.  This is possible ischemia.  He is referred to the emergency room.  The patient declined an ambulance.  He went by cab.  The case was discussed with the emergency room physician.  The patient has nonobstructive coronary artery disease by CAT scan.  His blood pressure control is acceptable.  He will continue amlodipine. [EKG obtained to assist in diagnosis and management of assessed problem(s)] : EKG obtained to assist in diagnosis and management of assessed problem(s)

## 2022-12-03 NOTE — PHYSICAL EXAM
[Well Developed] : well developed [Well Nourished] : well nourished [No Acute Distress] : no acute distress [Normal Conjunctiva] : normal conjunctiva [Normal Venous Pressure] : normal venous pressure [No Carotid Bruit] : no carotid bruit [Normal S1, S2] : normal S1, S2 [No Rub] : no rub [No Gallop] : no gallop [Clear Lung Fields] : clear lung fields [Good Air Entry] : good air entry [No Respiratory Distress] : no respiratory distress  [Soft] : abdomen soft [Non Tender] : non-tender [No Masses/organomegaly] : no masses/organomegaly [Normal Bowel Sounds] : normal bowel sounds [Normal Gait] : normal gait [No Edema] : no edema [No Cyanosis] : no cyanosis [No Clubbing] : no clubbing [No Varicosities] : no varicosities [Normal DP B/L] : normal DP B/L [No Rash] : no rash [No Skin Lesions] : no skin lesions [Moves all extremities] : moves all extremities [No Focal Deficits] : no focal deficits [Normal Speech] : normal speech [Alert and Oriented] : alert and oriented [Normal memory] : normal memory [de-identified] : 2/6 systolic ejection murmur

## 2022-12-03 NOTE — HISTORY OF PRESENT ILLNESS
[FreeTextEntry1] : The patient awoke to go to the bathroom.  On his way back he had substernal chest discomfort which was more severe for 5 minutes and mild for 2 hours.  He still has mild residual male.  He has had a mild chest discomfort every 3 to 4 days.  His recent blood pressure was 147/80.  The pain is sharp severe and possibly positional.  He has had it walking for months every 3 to 4 days.  He walks 1-2 hours a day.

## 2022-12-11 NOTE — DISCUSSION/SUMMARY
[FreeTextEntry1] : The patient had an episode of flank pain which has resolved. He is eating and drinking normally. His exam is benign. The lipase was elevated. The patient will get an MRI of his abdomen. He will continue to avoid alcohol. He will continue his current medication. wound(s)

## 2023-04-07 LAB — PSA SERPL-MCNC: <0.01 NG/ML

## 2023-04-13 ENCOUNTER — RESULT CHARGE (OUTPATIENT)
Age: 73
End: 2023-04-13

## 2023-04-13 ENCOUNTER — APPOINTMENT (OUTPATIENT)
Dept: UROLOGY | Facility: CLINIC | Age: 73
End: 2023-04-13
Payer: MEDICARE

## 2023-04-13 VITALS
BODY MASS INDEX: 23.82 KG/M2 | SYSTOLIC BLOOD PRESSURE: 145 MMHG | OXYGEN SATURATION: 97 % | HEART RATE: 81 BPM | DIASTOLIC BLOOD PRESSURE: 71 MMHG | TEMPERATURE: 96.5 F | WEIGHT: 166 LBS

## 2023-04-13 LAB
BILIRUB UR QL STRIP: NORMAL
CLARITY UR: CLEAR
COLLECTION METHOD: NORMAL
GLUCOSE UR-MCNC: NORMAL
HCG UR QL: 0.2 EU/DL
HGB UR QL STRIP.AUTO: NORMAL
KETONES UR-MCNC: NORMAL
LEUKOCYTE ESTERASE UR QL STRIP: NORMAL
NITRITE UR QL STRIP: NORMAL
PH UR STRIP: 5.5
PROT UR STRIP-MCNC: NORMAL
SP GR UR STRIP: >=1.03

## 2023-04-13 PROCEDURE — 51798 US URINE CAPACITY MEASURE: CPT

## 2023-04-13 PROCEDURE — 99215 OFFICE O/P EST HI 40 MIN: CPT

## 2023-04-13 NOTE — ADDENDUM
[FreeTextEntry1] : A portion of this note was written by [Juan Hinton] on 04/12/2023 acting as a scribe for Dr. Licona. \par \par I have personally reviewed the chart and agree that the record accurately reflects my personal performance of the history, physical exam, assessment, and plan.

## 2023-04-13 NOTE — HISTORY OF PRESENT ILLNESS
[FreeTextEntry1] : Mr. EMERALD RUIZ comes in today for his urologic follow-up. He underwent a robotic assisted laparoscopic radical prostatectomy (RALP) with Dr. Michele in July 2022 (see below).  The prior biopsy report identified a Grade Group 2 prostatic adenocarcinoma (see below).  Since the procedure, he has been experiencing mild stress urinary incontinence and wears 1 diaper per day which is minimally saturated. He continues performing kegel exercises for this and has noted progressive improvement. Additionally, he reports diminished erectile function before the RALP, which has worsened post-operatively. He has not been using a PDE5 inhibitor as had been advised. \par \par Mr. Ruiz had a TURP in approximately 2012 to address urinary retention and a laser prostatectomy in approximately 2002. In the past he had been on Flomax and dutasteride.\par IPSS: 2/35\par Sono (performed to assess bladder emptying): Nil PVR\par \par Mr. Ruiz has a history of urolithiasis with three prior episodes of renal colic (most recently in 2007). He has been asymptomatic since. He is continuing on potassium citrate and allopurinol. \par \par PSAs: 4/5/23--<0.01; 1/20/22--6.47;  12/28/21--6.83; 9/25/17--3.52; 11/25/15--2.75; \par \par Path (RALP): 7/6/22--pT3a, NO, Grade Group 2, neg margins\par \par Pathology (Prostate Biopsy): 3/15/22--Grade Group 2 prostatic adenocarcinoma (Martin 3+4) in 3 cores with a maximum of 75% tumor involvement. Grade Group 1 (Chata 3+3) in 2 cores involving <5% of the tissue; \par \par CT: 4/4/22--1cm enhancing lesion in the medial lower pole of the left kidney which may represent a small renal cell carcinoma. Recommend MRI for further evaluation. No lymphadenopathy or periprostatic infiltration. Small fat-containing right inguinal hernia. Tiny sclerotic lesions in vertebral bodies L4 and L5 which likely represent bone islands; \par \par Bone Scan: 4/18/22--No scintigraphic evidence of osseous metastases; \par \par MRI: 1/21/22--0.8 x 0.8cm lesion in the right posteromedial midgland peripheral zone suspicious for prostate cancer. PI-RADS 4. Prostate 79cc. No extraprostatic extension or seminal vesicle invasion; \par \par US Abd: 2/16/23--Stable 1.6cm heterogenous left mid renal cortical lesion; 8/27/22--1.4cm heterogenous left mid posterior renal mass; 2/8/22--Gallbladder absent. Otherwise unremarkable; 3/26/21--Gallbladder absent;

## 2023-04-13 NOTE — ASSESSMENT
[FreeTextEntry1] : I discussed the findings and options with . EMERALD RUIZ in detail. He is doing relatively well after his RALP. I advised that he continue with the Kegel exercises for his postoperative urinary leakage, which is improving. Regarding the erectile dysfunction, I reviewed penile rehabilitation with him and prescribed sildenafil 100mg to be used 2-3 times weekly to enhance penile blood flow. I outlined its appropriate use and potential side effects.\par \par The incidentally detected small left renal mass should be evaluated with a repeat CT of the abdomen and I will call him with that result.  He understands that at the current small size, the standard of care is to continue with active surveillance and semi-annual followup. The average growth rate of ~2-3 mm/year and metastasis rates of ~2-3% on active surveillance over 5 year interval for small renal masses <4 cm was discussed. \par With respect to treatment we reviewed ablation (cryosurgery, radiofrequency ablation) and partial nephrectomy/tumor enucleation.\par \par Providing that there are no new problems, I look forward to seeing Mr. Ruiz in 6 mths (PSA, sono).

## 2023-04-13 NOTE — LETTER BODY
[Dear  ___] : Dear  [unfilled], [Consult Letter:] : I had the pleasure of evaluating your patient, [unfilled]. [Please see my note below.] : Please see my note below. [Consult Closing:] : Thank you very much for allowing me to participate in the care of this patient.  If you have any questions, please do not hesitate to contact me. [Sincerely,] : Sincerely, [DrVero  ___] : Dr. BLAS [FreeTextEntry3] : Jude Licona MD, FACS

## 2023-04-13 NOTE — PHYSICAL EXAM
[General Appearance - Well Developed] : well developed [General Appearance - Well Nourished] : well nourished [Normal Appearance] : normal appearance [Well Groomed] : well groomed [General Appearance - In No Acute Distress] : no acute distress [Abdomen Soft] : soft [Abdomen Tenderness] : non-tender [Costovertebral Angle Tenderness] : no ~M costovertebral angle tenderness [Urethral Meatus] : meatus normal [Urinary Bladder Findings] : the bladder was normal on palpation [Scrotum] : the scrotum was normal [Testes Mass (___cm)] : there were no testicular masses [Edema] : no peripheral edema [] : no respiratory distress [Respiration, Rhythm And Depth] : normal respiratory rhythm and effort [Exaggerated Use Of Accessory Muscles For Inspiration] : no accessory muscle use [Oriented To Time, Place, And Person] : oriented to person, place, and time [Affect] : the affect was normal [Mood] : the mood was normal [Not Anxious] : not anxious [Normal Station and Gait] : the gait and station were normal for the patient's age [No Focal Deficits] : no focal deficits [No Palpable Adenopathy] : no palpable adenopathy [Abdomen Mass (___ Cm)] : no abdominal mass palpated [Abdomen Hernia] : no hernia was discovered [Penis Abnormality] : normal circumcised penis [Testes Tenderness] : no tenderness of the testes

## 2023-05-04 ENCOUNTER — APPOINTMENT (OUTPATIENT)
Dept: NUCLEAR MEDICINE | Facility: HOSPITAL | Age: 73
End: 2023-05-04

## 2023-05-04 ENCOUNTER — OUTPATIENT (OUTPATIENT)
Dept: OUTPATIENT SERVICES | Facility: HOSPITAL | Age: 73
LOS: 1 days | End: 2023-05-04
Payer: MEDICARE

## 2023-05-04 PROCEDURE — 78830 RP LOCLZJ TUM SPECT W/CT 1: CPT

## 2023-05-04 PROCEDURE — 78830 RP LOCLZJ TUM SPECT W/CT 1: CPT | Mod: 26

## 2023-05-04 PROCEDURE — A9500: CPT

## 2023-05-09 ENCOUNTER — NON-APPOINTMENT (OUTPATIENT)
Age: 73
End: 2023-05-09

## 2023-05-10 ENCOUNTER — NON-APPOINTMENT (OUTPATIENT)
Age: 73
End: 2023-05-10

## 2023-05-10 ENCOUNTER — APPOINTMENT (OUTPATIENT)
Dept: HEART AND VASCULAR | Facility: CLINIC | Age: 73
End: 2023-05-10
Payer: MEDICARE

## 2023-05-10 VITALS
DIASTOLIC BLOOD PRESSURE: 64 MMHG | WEIGHT: 166 LBS | HEIGHT: 70 IN | HEART RATE: 70 BPM | BODY MASS INDEX: 23.77 KG/M2 | SYSTOLIC BLOOD PRESSURE: 136 MMHG

## 2023-05-10 PROCEDURE — 93000 ELECTROCARDIOGRAM COMPLETE: CPT

## 2023-05-10 PROCEDURE — 99214 OFFICE O/P EST MOD 30 MIN: CPT | Mod: 25

## 2023-05-10 NOTE — HISTORY OF PRESENT ILLNESS
[FreeTextEntry1] : 2 weeks ago while walking the patient had severe sharp chest discomfort which lasted for 1-1.5 minutes.  He has had this before.  Today he walked here from  Street.  He has had other episodes which have occurred at rest lasting seconds to a minute.  Pain is positional and there is tenderness.

## 2023-05-10 NOTE — DISCUSSION/SUMMARY
[FreeTextEntry1] : The patient had recurrence of his chest pain.  It is atypical/nonanginal.  EKG shows ST segment and T wave abnormalities which is unchanged.  His recent CAT scan angiogram showed nonobstructive coronary disease.  The pain is noncardiac.  He we will continue amlodipine and his other medication. [EKG obtained to assist in diagnosis and management of assessed problem(s)] : EKG obtained to assist in diagnosis and management of assessed problem(s)

## 2023-05-10 NOTE — PHYSICAL EXAM
[Well Developed] : well developed [Well Nourished] : well nourished [No Acute Distress] : no acute distress [Normal Conjunctiva] : normal conjunctiva [Normal Venous Pressure] : normal venous pressure [No Carotid Bruit] : no carotid bruit [Normal S1, S2] : normal S1, S2 [No Rub] : no rub [No Gallop] : no gallop [Clear Lung Fields] : clear lung fields [Good Air Entry] : good air entry [No Respiratory Distress] : no respiratory distress  [Soft] : abdomen soft [Non Tender] : non-tender [No Masses/organomegaly] : no masses/organomegaly [Normal Bowel Sounds] : normal bowel sounds [Normal Gait] : normal gait [No Edema] : no edema [No Cyanosis] : no cyanosis [No Clubbing] : no clubbing [No Varicosities] : no varicosities [Normal DP B/L] : normal DP B/L [No Rash] : no rash [No Skin Lesions] : no skin lesions [Moves all extremities] : moves all extremities [No Focal Deficits] : no focal deficits [Normal Speech] : normal speech [Alert and Oriented] : alert and oriented [Normal memory] : normal memory [de-identified] : 2/6 systolic ejection murmur

## 2023-06-21 ENCOUNTER — APPOINTMENT (OUTPATIENT)
Dept: ORTHOPEDIC SURGERY | Facility: CLINIC | Age: 73
End: 2023-06-21
Payer: MEDICARE

## 2023-06-21 VITALS
HEART RATE: 82 BPM | SYSTOLIC BLOOD PRESSURE: 145 MMHG | DIASTOLIC BLOOD PRESSURE: 77 MMHG | HEIGHT: 70 IN | OXYGEN SATURATION: 97 % | BODY MASS INDEX: 23.77 KG/M2 | WEIGHT: 166 LBS

## 2023-06-21 DIAGNOSIS — M75.02 ADHESIVE CAPSULITIS OF LEFT SHOULDER: ICD-10-CM

## 2023-06-21 PROCEDURE — 20610 DRAIN/INJ JOINT/BURSA W/O US: CPT | Mod: LT

## 2023-06-21 PROCEDURE — 99215 OFFICE O/P EST HI 40 MIN: CPT | Mod: 25

## 2023-06-21 PROCEDURE — 73030 X-RAY EXAM OF SHOULDER: CPT | Mod: LT

## 2023-06-22 NOTE — DISCUSSION/SUMMARY
[de-identified] : Left shoulder with contracture and capsulitis vs differential dx of early OA. \par Plan\par Injection GH joint\par Meloxicam PO \par PT for possible adhesive capsulitis or mobilization of early OA. \par F/U 6-8 weeks

## 2023-06-22 NOTE — PROCEDURE
[de-identified] : The left shoulder was prepped with alchohol and ethyl chloride was used to numb the skin. A 6 cc injection with 3 cc xylocaine, 2 cc sensoricaine and 1 cc kenalog , was given without complication into the SA & GH spaces. Patient instructed that there may be an inflammatory flare for 24 hrs , to use ice or advil if needed\par

## 2023-06-22 NOTE — PHYSICAL EXAM
[de-identified] : Left shoulder AROM PROM  ER 75 IR L4\par SS IS and subscap 5/5 to isometric testing with pain  [de-identified] : 4 views of left shoulder show zac early joint narrowing no head elevation no fx or spurs.

## 2023-06-28 ENCOUNTER — APPOINTMENT (OUTPATIENT)
Dept: HEART AND VASCULAR | Facility: CLINIC | Age: 73
End: 2023-06-28
Payer: MEDICARE

## 2023-06-28 PROCEDURE — 99213 OFFICE O/P EST LOW 20 MIN: CPT | Mod: 95

## 2023-06-29 ENCOUNTER — LABORATORY RESULT (OUTPATIENT)
Age: 73
End: 2023-06-29

## 2023-06-29 ENCOUNTER — APPOINTMENT (OUTPATIENT)
Dept: HEART AND VASCULAR | Facility: CLINIC | Age: 73
End: 2023-06-29
Payer: MEDICARE

## 2023-06-29 PROCEDURE — 36415 COLL VENOUS BLD VENIPUNCTURE: CPT

## 2023-06-30 NOTE — HISTORY OF PRESENT ILLNESS
[FreeTextEntry1] : The patient has had sweats for the last 2 weeks nightly.  He needs to change his shirt.  He has not had nightmares or been under stress.  He has had occasional sweats in the past.  He denies abdominal pain.  His blood pressure is 145/70.  He has not had a cough or dysuria.  He had a sore throat a month ago.  He is not feverish or weak.  He has had mild chest discomfort.  Patient has palpitations every 3-4 days he read that meloxicam causes palpitations.  These last less than a second.  He walks 1.5-2 hours.  He weighs 166 pounds.  He is getting follow-up for his prostate and kidney.  He has not been treated with any hormonal therapy.

## 2023-06-30 NOTE — DISCUSSION/SUMMARY
[FreeTextEntry1] : The patient has sweats which have increased from prior.  There is no localized sign of infection.  We will get laboratory studies and a chest x-ray.  We will follow-up with the urologist.  His blood pressure is adequate.

## 2023-07-03 LAB
ALBUMIN SERPL ELPH-MCNC: 4.6 G/DL
ALP BLD-CCNC: 67 U/L
ALT SERPL-CCNC: 16 U/L
ANION GAP SERPL CALC-SCNC: 13 MMOL/L
APPEARANCE: CLEAR
AST SERPL-CCNC: 13 U/L
BILIRUB DIRECT SERPL-MCNC: 0.2 MG/DL
BILIRUB INDIRECT SERPL-MCNC: 0.5 MG/DL
BILIRUB SERPL-MCNC: 0.8 MG/DL
BILIRUBIN URINE: ABNORMAL
BLOOD URINE: NEGATIVE
BUN SERPL-MCNC: 28 MG/DL
CALCIUM SERPL-MCNC: 9.5 MG/DL
CHLORIDE SERPL-SCNC: 105 MMOL/L
CHOLEST SERPL-MCNC: 127 MG/DL
CO2 SERPL-SCNC: 24 MMOL/L
COLOR: NORMAL
CREAT SERPL-MCNC: 1.22 MG/DL
EGFR: 63 ML/MIN/1.73M2
ESTIMATED AVERAGE GLUCOSE: 117 MG/DL
GLUCOSE QUALITATIVE U: NEGATIVE MG/DL
GLUCOSE SERPL-MCNC: 102 MG/DL
HBA1C MFR BLD HPLC: 5.7 %
HDLC SERPL-MCNC: 45 MG/DL
KETONES URINE: ABNORMAL MG/DL
LDLC SERPL CALC-MCNC: 69 MG/DL
LEUKOCYTE ESTERASE URINE: ABNORMAL
NITRITE URINE: NEGATIVE
NONHDLC SERPL-MCNC: 81 MG/DL
PH URINE: 5.5
POTASSIUM SERPL-SCNC: 4.6 MMOL/L
PROT SERPL-MCNC: 6.8 G/DL
PROTEIN URINE: 30 MG/DL
PSA SERPL-MCNC: <0.01 NG/ML
SODIUM SERPL-SCNC: 142 MMOL/L
SPECIFIC GRAVITY URINE: 1.03
TRIGL SERPL-MCNC: 64 MG/DL
TSH SERPL-ACNC: 5.26 UIU/ML
UROBILINOGEN URINE: 1 MG/DL

## 2023-07-31 ENCOUNTER — APPOINTMENT (OUTPATIENT)
Dept: HEART AND VASCULAR | Facility: CLINIC | Age: 73
End: 2023-07-31
Payer: MEDICARE

## 2023-07-31 VITALS
HEART RATE: 71 BPM | DIASTOLIC BLOOD PRESSURE: 76 MMHG | TEMPERATURE: 97.7 F | SYSTOLIC BLOOD PRESSURE: 146 MMHG | WEIGHT: 166 LBS | OXYGEN SATURATION: 98 % | BODY MASS INDEX: 23.77 KG/M2 | HEIGHT: 70 IN

## 2023-07-31 DIAGNOSIS — Z00.00 ENCOUNTER FOR GENERAL ADULT MEDICAL EXAMINATION W/OUT ABNORMAL FINDINGS: ICD-10-CM

## 2023-07-31 PROCEDURE — G0439: CPT

## 2023-07-31 PROCEDURE — 93000 ELECTROCARDIOGRAM COMPLETE: CPT

## 2023-07-31 PROCEDURE — 36415 COLL VENOUS BLD VENIPUNCTURE: CPT

## 2023-07-31 RX ORDER — SPIRONOLACTONE 25 MG/1
25 TABLET ORAL
Qty: 45 | Refills: 3 | Status: ACTIVE | COMMUNITY
Start: 2023-07-31 | End: 1900-01-01

## 2023-07-31 RX ORDER — MELOXICAM 15 MG/1
15 TABLET ORAL DAILY
Qty: 30 | Refills: 2 | Status: DISCONTINUED | COMMUNITY
Start: 2023-06-21 | End: 2023-07-31

## 2023-07-31 NOTE — PHYSICAL EXAM
[Well Developed] : well developed [Well Nourished] : well nourished [No Acute Distress] : no acute distress [Normal Conjunctiva] : normal conjunctiva [Normal Venous Pressure] : normal venous pressure [No Carotid Bruit] : no carotid bruit [Normal S1, S2] : normal S1, S2 [No Rub] : no rub [No Gallop] : no gallop [Clear Lung Fields] : clear lung fields [Good Air Entry] : good air entry [No Respiratory Distress] : no respiratory distress  [Soft] : abdomen soft [Non Tender] : non-tender [No Masses/organomegaly] : no masses/organomegaly [Normal Bowel Sounds] : normal bowel sounds [Normal Gait] : normal gait [No Edema] : no edema [No Cyanosis] : no cyanosis [No Clubbing] : no clubbing [No Varicosities] : no varicosities [Normal DP B/L] : normal DP B/L [No Rash] : no rash [No Skin Lesions] : no skin lesions [Moves all extremities] : moves all extremities [No Focal Deficits] : no focal deficits [Normal Speech] : normal speech [Alert and Oriented] : alert and oriented [Normal memory] : normal memory [de-identified] : Moderate to severe hearing loss [de-identified] : 2/6 systolic ejection murmur [de-identified] : Rectal testicular and hernia exams are done by the urologist.

## 2023-07-31 NOTE — DISCUSSION/SUMMARY
[FreeTextEntry1] : The patient no longer has sweats and his weight is stabilized.  The EKG shows minimal ST depression but no significant change.  He will go for shingles and Prevnar 20 vaccines.  He declines a hearing aid.  His blood pressure is uncontrolled.  We will start spironolactone.  The patient will contact me what the names of his other providers. He is not having any cognitive impairment. He has no significant current or past depression. He is fully functional and there are no safety issues. He will be screened for eye examination, Colonoscopy  and Immunizations over the next 10 years. The patient was furnished with personalized health advice and does not need referral to health education or preventative counseling services. Patient does not need advanced care planning services.  Laboratory testing was done. [EKG obtained to assist in diagnosis and management of assessed problem(s)] : EKG obtained to assist in diagnosis and management of assessed problem(s)

## 2023-07-31 NOTE — HISTORY OF PRESENT ILLNESS
[FreeTextEntry1] : The patient no longer has sweats.  His weight has stabilized.  Walks 1.5 hours.  Diet is low in red meat and carbs.  Does not take meloxicam.  He has not had any gout attacks.  He had an eye exam 1 year ago.  He has had 4 COVID vaccines.  He has noted hearing loss.

## 2023-08-14 ENCOUNTER — APPOINTMENT (OUTPATIENT)
Dept: ORTHOPEDIC SURGERY | Facility: CLINIC | Age: 73
End: 2023-08-14
Payer: MEDICARE

## 2023-08-14 PROCEDURE — 99213 OFFICE O/P EST LOW 20 MIN: CPT

## 2023-08-14 NOTE — HISTORY OF PRESENT ILLNESS
[de-identified] : EMERALD RUIZ 73 year male Followup for left shoulder pain. Pt states his left shoulder feels much better. Pt continues Physical Therapy twice a week.

## 2023-09-06 ENCOUNTER — APPOINTMENT (OUTPATIENT)
Dept: HEART AND VASCULAR | Facility: CLINIC | Age: 73
End: 2023-09-06
Payer: MEDICARE

## 2023-09-06 VITALS
WEIGHT: 169 LBS | OXYGEN SATURATION: 98 % | DIASTOLIC BLOOD PRESSURE: 64 MMHG | HEART RATE: 69 BPM | BODY MASS INDEX: 24.2 KG/M2 | TEMPERATURE: 97.3 F | HEIGHT: 70 IN | SYSTOLIC BLOOD PRESSURE: 122 MMHG

## 2023-09-06 DIAGNOSIS — E03.9 HYPOTHYROIDISM, UNSPECIFIED: ICD-10-CM

## 2023-09-06 DIAGNOSIS — M10.9 GOUT, UNSPECIFIED: ICD-10-CM

## 2023-09-06 PROCEDURE — 99213 OFFICE O/P EST LOW 20 MIN: CPT

## 2023-09-06 RX ORDER — CLOTRIMAZOLE AND BETAMETHASONE DIPROPIONATE 10; .5 MG/G; MG/G
1-0.05 CREAM TOPICAL
Qty: 1 | Refills: 1 | Status: DISCONTINUED | COMMUNITY
Start: 2022-07-28 | End: 2023-09-06

## 2023-09-06 NOTE — PHYSICAL EXAM
[Well Developed] : well developed [Well Nourished] : well nourished [No Acute Distress] : no acute distress [Normal Conjunctiva] : normal conjunctiva [Normal Venous Pressure] : normal venous pressure [No Carotid Bruit] : no carotid bruit [Normal S1, S2] : normal S1, S2 [No Rub] : no rub [No Gallop] : no gallop [Clear Lung Fields] : clear lung fields [Good Air Entry] : good air entry [No Respiratory Distress] : no respiratory distress  [Soft] : abdomen soft [Non Tender] : non-tender [No Masses/organomegaly] : no masses/organomegaly [Normal Bowel Sounds] : normal bowel sounds [Normal Gait] : normal gait [No Edema] : no edema [No Cyanosis] : no cyanosis [No Clubbing] : no clubbing [No Varicosities] : no varicosities [Normal DP B/L] : normal DP B/L [No Rash] : no rash [No Skin Lesions] : no skin lesions [Moves all extremities] : moves all extremities [No Focal Deficits] : no focal deficits [Normal Speech] : normal speech [Alert and Oriented] : alert and oriented [Normal memory] : normal memory [de-identified] : Moderate to severe hearing loss [de-identified] : 2/6 systolic ejection murmur [de-identified] : Rectal testicular and hernia exams are done by the urologist.

## 2023-09-06 NOTE — HISTORY OF PRESENT ILLNESS
[FreeTextEntry1] : Patient has had cramps in his hands.  He walks 1-2 hours without difficulty.  His diet is excellent.  He has red meat once every 2 to 3 weeks.  He has not had gout.

## 2023-09-06 NOTE — DISCUSSION/SUMMARY
[FreeTextEntry1] : The patient has hypertension gout and hypercholesterolemia.  He also has hypothyroidism.  Laboratory studies were done.  His blood pressure is well controlled.  He will continue being on lisinopril with spironolactone.

## 2023-09-07 LAB
ANION GAP SERPL CALC-SCNC: 9 MMOL/L
BUN SERPL-MCNC: 20 MG/DL
CALCIUM SERPL-MCNC: 9.9 MG/DL
CHLORIDE SERPL-SCNC: 106 MMOL/L
CO2 SERPL-SCNC: 26 MMOL/L
CREAT SERPL-MCNC: 1.22 MG/DL
EGFR: 63 ML/MIN/1.73M2
GLUCOSE SERPL-MCNC: 100 MG/DL
POTASSIUM SERPL-SCNC: 5 MMOL/L
SODIUM SERPL-SCNC: 142 MMOL/L
T4 FREE SERPL-MCNC: 1.3 NG/DL
TSH SERPL-ACNC: 2.42 UIU/ML
URATE SERPL-MCNC: 4.9 MG/DL

## 2023-09-14 NOTE — ED PROVIDER NOTE - QRS
Patient is a 71 YO F with multiple comorbidities including CKD4 not yet on HD with left arm brachiobasilic fistula creation on 5/31/2023 with post op balloon assisted maturation who presents to ED after being prompted by nephrologist for purulence and dehiscence from left arm fistula incision site. Patient a blister over the site on 4 days ago that today popped. She also reports 2 days of fevers and chills at home. Here she is non toxic appearing with normal vitals and a WBC of 10. There is an open wound at the distal lower arm with surrounding erythema with purulence expressed. She is being admitted to medicine with plans for IV antibiotic coverage. She will likely need wash out later this admission. We will also monitor her lower extremity wounds and re wrap her legs.
90

## 2023-09-26 ENCOUNTER — RX RENEWAL (OUTPATIENT)
Age: 73
End: 2023-09-26

## 2023-09-26 RX ORDER — AMLODIPINE BESYLATE 10 MG/1
10 TABLET ORAL DAILY
Qty: 90 | Refills: 3 | Status: ACTIVE | COMMUNITY
Start: 2021-03-22 | End: 1900-01-01

## 2023-09-27 ENCOUNTER — APPOINTMENT (OUTPATIENT)
Dept: ORTHOPEDIC SURGERY | Facility: CLINIC | Age: 73
End: 2023-09-27
Payer: MEDICARE

## 2023-09-27 DIAGNOSIS — M19.012 PRIMARY OSTEOARTHRITIS, LEFT SHOULDER: ICD-10-CM

## 2023-09-27 PROCEDURE — 99213 OFFICE O/P EST LOW 20 MIN: CPT

## 2023-10-13 ENCOUNTER — OUTPATIENT (OUTPATIENT)
Dept: OUTPATIENT SERVICES | Facility: HOSPITAL | Age: 73
LOS: 1 days | End: 2023-10-13
Payer: MEDICARE

## 2023-10-13 ENCOUNTER — APPOINTMENT (OUTPATIENT)
Dept: MRI IMAGING | Facility: HOSPITAL | Age: 73
End: 2023-10-13

## 2023-10-13 PROCEDURE — 74183 MRI ABD W/O CNTR FLWD CNTR: CPT

## 2023-10-13 PROCEDURE — A9585: CPT

## 2023-10-13 PROCEDURE — 74183 MRI ABD W/O CNTR FLWD CNTR: CPT | Mod: 26,MH

## 2023-10-20 ENCOUNTER — APPOINTMENT (OUTPATIENT)
Dept: UROLOGY | Facility: CLINIC | Age: 73
End: 2023-10-20
Payer: MEDICARE

## 2023-10-20 PROCEDURE — 51798 US URINE CAPACITY MEASURE: CPT

## 2023-10-20 PROCEDURE — 99215 OFFICE O/P EST HI 40 MIN: CPT

## 2023-10-20 PROCEDURE — 76775 US EXAM ABDO BACK WALL LIM: CPT

## 2023-11-10 ENCOUNTER — RX RENEWAL (OUTPATIENT)
Age: 73
End: 2023-11-10

## 2023-11-10 RX ORDER — ATORVASTATIN CALCIUM 10 MG/1
10 TABLET, FILM COATED ORAL
Qty: 90 | Refills: 3 | Status: ACTIVE | COMMUNITY
Start: 2017-01-11 | End: 1900-01-01

## 2023-11-29 ENCOUNTER — NON-APPOINTMENT (OUTPATIENT)
Age: 73
End: 2023-11-29

## 2023-12-05 ENCOUNTER — NON-APPOINTMENT (OUTPATIENT)
Age: 73
End: 2023-12-05

## 2023-12-05 PROBLEM — N20.9 UROLITHIASIS: Status: ACTIVE | Noted: 2022-04-21

## 2023-12-05 PROBLEM — R39.9 LOWER URINARY TRACT SYMPTOMS (LUTS): Status: ACTIVE | Noted: 2022-04-21

## 2023-12-05 PROBLEM — C61 PROSTATE CANCER: Status: ACTIVE | Noted: 2022-04-21

## 2023-12-05 PROBLEM — N52.01 ERECTILE DYSFUNCTION DUE TO ARTERIAL INSUFFICIENCY: Status: ACTIVE | Noted: 2022-04-21

## 2023-12-05 PROBLEM — N28.89 LEFT RENAL MASS: Status: ACTIVE | Noted: 2022-04-21

## 2023-12-06 ENCOUNTER — APPOINTMENT (OUTPATIENT)
Dept: UROLOGY | Facility: CLINIC | Age: 73
End: 2023-12-06
Payer: MEDICARE

## 2023-12-06 DIAGNOSIS — R07.89 OTHER CHEST PAIN: ICD-10-CM

## 2023-12-06 DIAGNOSIS — C61 MALIGNANT NEOPLASM OF PROSTATE: ICD-10-CM

## 2023-12-06 DIAGNOSIS — N20.9 URINARY CALCULUS, UNSPECIFIED: ICD-10-CM

## 2023-12-06 DIAGNOSIS — N28.89 OTHER SPECIFIED DISORDERS OF KIDNEY AND URETER: ICD-10-CM

## 2023-12-06 DIAGNOSIS — N52.01 ERECTILE DYSFUNCTION DUE TO ARTERIAL INSUFFICIENCY: ICD-10-CM

## 2023-12-06 DIAGNOSIS — R39.9 UNSPECIFIED SYMPTOMS AND SIGNS INVOLVING THE GENITOURINARY SYSTEM: ICD-10-CM

## 2023-12-06 PROCEDURE — 99215 OFFICE O/P EST HI 40 MIN: CPT

## 2023-12-07 ENCOUNTER — NON-APPOINTMENT (OUTPATIENT)
Age: 73
End: 2023-12-07

## 2023-12-07 LAB — PSA SERPL-MCNC: <0.01 NG/ML

## 2023-12-15 ENCOUNTER — NON-APPOINTMENT (OUTPATIENT)
Age: 73
End: 2023-12-15

## 2023-12-15 ENCOUNTER — RX RENEWAL (OUTPATIENT)
Age: 73
End: 2023-12-15

## 2024-01-13 NOTE — ED PROVIDER NOTE - PROGRESS NOTE DETAILS
[place]: UA w/ no infection. labs grossly wnl. CT showing possible pancreatitis. Lipase is minimally elevated. Pt has no epigastric ttp. Also tolerating PO. Pain is now resolved. abd soft ntnd. Possible very mild pancreatitis. Benign abdomen. Comfortable for dc, outpt pmd f/u.
4

## 2024-05-21 NOTE — ED ADULT NURSE NOTE - SINCE THE ILLNESS OR INJURY
Noted.  Thank you.   No -Elevated serum ast and alkaline phosphatase noted   -Suspect that this is most likely secondary to fluid overload in setting of acute decompensated heart failure   -Will manage with diuretics and will follow serum liver enzymes daily   - RUQ US showing ?7mm calculus in mid common bile duct.   - GGT wnl suggestive that elevated Alk phos not biliary in etiology, possibly from bone.   -  skeletal survey w/ questionable skull lesion  - MRCP w/o stone or evidence of infection  - CT A+P w/ sclerotic bone lesions, consistent with osseous metastatic disease, which likely result in elevated alk phos

## 2024-05-31 ENCOUNTER — APPOINTMENT (OUTPATIENT)
Dept: HEART AND VASCULAR | Facility: CLINIC | Age: 74
End: 2024-05-31
Payer: MEDICARE

## 2024-05-31 ENCOUNTER — NON-APPOINTMENT (OUTPATIENT)
Age: 74
End: 2024-05-31

## 2024-05-31 VITALS
DIASTOLIC BLOOD PRESSURE: 67 MMHG | TEMPERATURE: 98.5 F | BODY MASS INDEX: 24.05 KG/M2 | HEART RATE: 66 BPM | HEIGHT: 70 IN | OXYGEN SATURATION: 96 % | SYSTOLIC BLOOD PRESSURE: 118 MMHG | WEIGHT: 168 LBS

## 2024-05-31 DIAGNOSIS — I10 ESSENTIAL (PRIMARY) HYPERTENSION: ICD-10-CM

## 2024-05-31 DIAGNOSIS — R06.09 OTHER FORMS OF DYSPNEA: ICD-10-CM

## 2024-05-31 DIAGNOSIS — R29.818 OTHER SYMPTOMS AND SIGNS INVOLVING THE NERVOUS SYSTEM: ICD-10-CM

## 2024-05-31 DIAGNOSIS — R73.9 HYPERGLYCEMIA, UNSPECIFIED: ICD-10-CM

## 2024-05-31 DIAGNOSIS — R20.0 ANESTHESIA OF SKIN: ICD-10-CM

## 2024-05-31 PROCEDURE — 93880 EXTRACRANIAL BILAT STUDY: CPT

## 2024-05-31 PROCEDURE — 36415 COLL VENOUS BLD VENIPUNCTURE: CPT

## 2024-05-31 PROCEDURE — ZZZZZ: CPT

## 2024-05-31 PROCEDURE — 99215 OFFICE O/P EST HI 40 MIN: CPT | Mod: 25

## 2024-05-31 RX ORDER — OMEPRAZOLE 20 MG/1
20 CAPSULE, DELAYED RELEASE ORAL
Refills: 0 | Status: ACTIVE | COMMUNITY

## 2024-05-31 RX ORDER — NIRMATRELVIR AND RITONAVIR 300-100 MG
20 X 150 MG & KIT ORAL
Qty: 30 | Refills: 0 | Status: DISCONTINUED | COMMUNITY
Start: 2023-10-25 | End: 2024-05-31

## 2024-05-31 RX ORDER — SILDENAFIL 100 MG/1
100 TABLET, FILM COATED ORAL
Qty: 6 | Refills: 11 | Status: ACTIVE | COMMUNITY
Start: 2023-04-13 | End: 1900-01-01

## 2024-06-01 PROBLEM — R06.09 DYSPNEA ON EXERTION: Status: ACTIVE | Noted: 2024-05-31

## 2024-06-01 NOTE — REVIEW OF SYSTEMS
[SOB] : shortness of breath [Muscle Cramps] : muscle cramps [Numbness (Hypoesthesia)] : numbness [Negative] : Heme/Lymph

## 2024-06-02 LAB
ALBUMIN SERPL ELPH-MCNC: 4.7 G/DL
ALP BLD-CCNC: 81 U/L
ALT SERPL-CCNC: 23 U/L
ANION GAP SERPL CALC-SCNC: 12 MMOL/L
APTT BLD: 30.9 SEC
AST SERPL-CCNC: 19 U/L
BILIRUB DIRECT SERPL-MCNC: 0.2 MG/DL
BILIRUB INDIRECT SERPL-MCNC: 0.4 MG/DL
BILIRUB SERPL-MCNC: 0.5 MG/DL
BUN SERPL-MCNC: 24 MG/DL
CALCIUM SERPL-MCNC: 9.7 MG/DL
CHLORIDE SERPL-SCNC: 105 MMOL/L
CHOLEST SERPL-MCNC: 151 MG/DL
CO2 SERPL-SCNC: 24 MMOL/L
CREAT SERPL-MCNC: 1.28 MG/DL
EGFR: 59 ML/MIN/1.73M2
ESTIMATED AVERAGE GLUCOSE: 120 MG/DL
GLUCOSE SERPL-MCNC: 111 MG/DL
HBA1C MFR BLD HPLC: 5.8 %
HCT VFR BLD CALC: 40.1 %
HDLC SERPL-MCNC: 37 MG/DL
HGB BLD-MCNC: 13.2 G/DL
INR PPP: 0.95 RATIO
LDLC SERPL CALC-MCNC: 99 MG/DL
MCHC RBC-ENTMCNC: 30.4 PG
MCHC RBC-ENTMCNC: 32.9 GM/DL
MCV RBC AUTO: 92.4 FL
NONHDLC SERPL-MCNC: 114 MG/DL
PLATELET # BLD AUTO: 208 K/UL
POTASSIUM SERPL-SCNC: 4.9 MMOL/L
PROT SERPL-MCNC: 6.9 G/DL
PT BLD: 10.8 SEC
RBC # BLD: 4.34 M/UL
RBC # FLD: 13.1 %
SODIUM SERPL-SCNC: 141 MMOL/L
TRIGL SERPL-MCNC: 79 MG/DL
URATE SERPL-MCNC: 4.7 MG/DL
WBC # FLD AUTO: 7.39 K/UL

## 2024-06-02 NOTE — HISTORY OF PRESENT ILLNESS
[FreeTextEntry1] : Wednesday night the patient developed numbness of his right thumb.  It is severe.  It has not changed since then.  He wakes up in the middle of a dream gasping for air.  This is occurred 4-5 times.  He snores.  He walks 1-1/2 hours.  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  He feels that his mood is okay.  He has not had any gout.

## 2024-06-02 NOTE — PHYSICAL EXAM
[Well Developed] : well developed [Well Nourished] : well nourished [No Acute Distress] : no acute distress [Normal Conjunctiva] : normal conjunctiva [Normal Venous Pressure] : normal venous pressure [No Carotid Bruit] : no carotid bruit [Normal S1, S2] : normal S1, S2 [No Rub] : no rub [No Gallop] : no gallop [Clear Lung Fields] : clear lung fields [Good Air Entry] : good air entry [No Respiratory Distress] : no respiratory distress  [Soft] : abdomen soft [Non Tender] : non-tender [No Masses/organomegaly] : no masses/organomegaly [Normal Bowel Sounds] : normal bowel sounds [Normal Gait] : normal gait [No Edema] : no edema [No Cyanosis] : no cyanosis [No Clubbing] : no clubbing [No Varicosities] : no varicosities [Normal DP B/L] : normal DP B/L [No Rash] : no rash [No Skin Lesions] : no skin lesions [Moves all extremities] : moves all extremities [No Focal Deficits] : no focal deficits [Normal Speech] : normal speech [Alert and Oriented] : alert and oriented [Normal memory] : normal memory [de-identified] : Moderate to severe hearing loss [de-identified] : 2/6 systolic ejection murmur [de-identified] : Rectal testicular and hernia exams are done by the urologist.

## 2024-06-02 NOTE — DISCUSSION/SUMMARY
[FreeTextEntry1] : The patient has numbness of his thumb.  This is not a typical distribution for stroke.  His carotid Doppler shows no significant obstruction.  He has a history of an ulcer but no bleeding.  He has no symptoms of an ulcer currently.  He will start aspirin 81 mg daily.  He was referred to neurology.  He will undergo 1 week cardiac monitor for atrial fibrillation.  The patient has possible sleep apnea.  He will undergo a sleep test.  His blood pressure is controlled.  He will continue amlodipine lisinopril and spironolactone.
(3) occasionally moist

## 2024-06-03 ENCOUNTER — RX RENEWAL (OUTPATIENT)
Age: 74
End: 2024-06-03

## 2024-06-27 ENCOUNTER — APPOINTMENT (OUTPATIENT)
Dept: HEART AND VASCULAR | Facility: CLINIC | Age: 74
End: 2024-06-27
Payer: MEDICARE

## 2024-06-27 DIAGNOSIS — R00.2 PALPITATIONS: ICD-10-CM

## 2024-06-27 PROCEDURE — 99441: CPT | Mod: 93

## 2024-07-09 ENCOUNTER — RX RENEWAL (OUTPATIENT)
Age: 74
End: 2024-07-09

## 2024-08-26 ENCOUNTER — NON-APPOINTMENT (OUTPATIENT)
Age: 74
End: 2024-08-26

## 2024-08-27 ENCOUNTER — APPOINTMENT (OUTPATIENT)
Dept: HEART AND VASCULAR | Facility: CLINIC | Age: 74
End: 2024-08-27
Payer: MEDICARE

## 2024-08-27 VITALS
HEART RATE: 69 BPM | SYSTOLIC BLOOD PRESSURE: 119 MMHG | WEIGHT: 168 LBS | BODY MASS INDEX: 24.05 KG/M2 | HEIGHT: 70 IN | RESPIRATION RATE: 16 BRPM | DIASTOLIC BLOOD PRESSURE: 63 MMHG

## 2024-08-27 DIAGNOSIS — I10 ESSENTIAL (PRIMARY) HYPERTENSION: ICD-10-CM

## 2024-08-27 DIAGNOSIS — Z00.00 ENCOUNTER FOR GENERAL ADULT MEDICAL EXAMINATION W/OUT ABNORMAL FINDINGS: ICD-10-CM

## 2024-08-27 DIAGNOSIS — R07.89 OTHER CHEST PAIN: ICD-10-CM

## 2024-08-27 DIAGNOSIS — E78.00 PURE HYPERCHOLESTEROLEMIA, UNSPECIFIED: ICD-10-CM

## 2024-08-27 DIAGNOSIS — I25.10 ATHEROSCLEROTIC HEART DISEASE OF NATIVE CORONARY ARTERY W/OUT ANGINA PECTORIS: ICD-10-CM

## 2024-08-27 DIAGNOSIS — R94.31 ABNORMAL ELECTROCARDIOGRAM [ECG] [EKG]: ICD-10-CM

## 2024-08-27 DIAGNOSIS — R00.2 PALPITATIONS: ICD-10-CM

## 2024-08-27 PROCEDURE — 93351 STRESS TTE COMPLETE: CPT

## 2024-08-27 PROCEDURE — 36415 COLL VENOUS BLD VENIPUNCTURE: CPT

## 2024-08-27 PROCEDURE — ZZZZZ: CPT | Mod: NC

## 2024-08-27 PROCEDURE — G0439: CPT

## 2024-08-27 NOTE — REVIEW OF SYSTEMS
[Negative] : Heme/Lymph [Chest Discomfort] : chest discomfort [Palpitations] : palpitations [Numbness (Hypoesthesia)] : numbness

## 2024-08-29 PROBLEM — I25.10 NONOBSTRUCTIVE ATHEROSCLEROSIS OF CORONARY ARTERY: Status: ACTIVE | Noted: 2024-08-29

## 2024-08-29 NOTE — PHYSICAL EXAM
[Well Developed] : well developed [Well Nourished] : well nourished [No Acute Distress] : no acute distress [Normal Conjunctiva] : normal conjunctiva [Normal Venous Pressure] : normal venous pressure [No Carotid Bruit] : no carotid bruit [Normal S1, S2] : normal S1, S2 [No Rub] : no rub [No Gallop] : no gallop [Clear Lung Fields] : clear lung fields [Good Air Entry] : good air entry [No Respiratory Distress] : no respiratory distress  [Soft] : abdomen soft [Non Tender] : non-tender [No Masses/organomegaly] : no masses/organomegaly [Normal Bowel Sounds] : normal bowel sounds [Normal Gait] : normal gait [No Cyanosis] : no cyanosis [No Clubbing] : no clubbing [No Varicosities] : no varicosities [Normal DP B/L] : normal DP B/L [Moves all extremities] : moves all extremities [No Focal Deficits] : no focal deficits [Normal Speech] : normal speech [Alert and Oriented] : alert and oriented [Normal memory] : normal memory [Edema ___] : edema [unfilled] [de-identified] : 2/6 systolic ejection murmur [de-identified] : Moderate to severe hearing loss [de-identified] : Rectal testicular and hernia exams are done by the urologist.

## 2024-08-29 NOTE — PHYSICAL EXAM
[Well Developed] : well developed [Well Nourished] : well nourished [No Acute Distress] : no acute distress [Normal Conjunctiva] : normal conjunctiva [Normal Venous Pressure] : normal venous pressure [No Carotid Bruit] : no carotid bruit [Normal S1, S2] : normal S1, S2 [No Rub] : no rub [No Gallop] : no gallop [Clear Lung Fields] : clear lung fields [Good Air Entry] : good air entry [No Respiratory Distress] : no respiratory distress  [Soft] : abdomen soft [Non Tender] : non-tender [No Masses/organomegaly] : no masses/organomegaly [Normal Bowel Sounds] : normal bowel sounds [Normal Gait] : normal gait [No Cyanosis] : no cyanosis [No Clubbing] : no clubbing [No Varicosities] : no varicosities [Normal DP B/L] : normal DP B/L [Moves all extremities] : moves all extremities [No Focal Deficits] : no focal deficits [Normal Speech] : normal speech [Alert and Oriented] : alert and oriented [Normal memory] : normal memory [Edema ___] : edema [unfilled] [de-identified] : 2/6 systolic ejection murmur [de-identified] : Moderate to severe hearing loss [de-identified] : Rectal testicular and hernia exams are done by the urologist.

## 2024-08-29 NOTE — DISCUSSION/SUMMARY
[FreeTextEntry1] : IR 74 year old Male with PMHx of HTN, HLD, hypothyroidism, prostate cancer s/p prostatectomy 7/2022, gout presents for annual health examination and stress echocardiogram.  Stress echocardiogram performed today  Chest discomfort / CAD risk / HLD: Intermittent episodes of sharp chest pain brought on by walking. Episodes last 1 minute, improve with rest. Occurs once a month, most recent 3 weeks ago. Overall patient has very good exercise tolerance and is able to maintain regular exercise. Stress echocardiogram performed today showed no signs of ischemia, patient felt moderate sharp pain in the chest that lasted less than one second at the end of stage 2. Symptoms did not persist or recur for the remainder of the test. CTA 12/2022 revealed calcium score 172, mild-mod mid LAD and RI stenosis due to noncalcific plaque and nonobstructive disease in remaining segments. Significant coronary disease is unlikely, symptoms are not likely due to be due to ischemia. ASCVD 24.02% LDL goal < 100, most recent LDL 99. Will continue cardiac risk factor optimization. Continue Atorvastatin 10, healthy diet low in saturated fats and exercise. He will continue to monitor his symptoms and he knows to contact us or head to the emergency room if his symptoms persist or worsen. Health maintenance: Recommend annual eye examination, dental cleaning twice a year. Continue annual skin cancer screening. Follow up with gastroenterologist regarding colon cancer screening. Recommend second dose of pneumococcal and shingles vaccines. Recommend influenza and RSV vaccines. Labs drawn today. Blood pressure is controlled. Hearing is reduced but he declines ENT referral. Continue annual health maintenance visits. HTN: BP goal < 130/80, controlled. Continue Amlodipine 10, Lisinopril 40, Spironolactone 25.  Hypothyroidism: TSH 2.42. Labs drawn today. Continue Levothyroxine 75 Gout: No recent flare ups. Continue allopurinol 100 2 tabs daily.  Follow up 4 months for echocardiogram.  I have discussed the case with MADELYN Barreto. I have personally performed a history, physical exam, and my own medical decision making. I have reviewed the note and agree with the findings and plan.   The patient has atypical chest pain although there is some typical elements.  The stress echocardiogram shows no ischemia.  Significant coronary artery disease is unlikely.  The patient will see the urologist.  The patient will contact me what the names of his other providers. He is not having any cognitive impairment. He has no significant current or past depression. He is fully functional and there are no safety issues. He will be screened for eye examination, Colonoscopy and Immunizations over the next 10 years. The patient was furnished with personalized health advice and does not need referral to health education or preventative counseling services. Patient does not need advanced care planning services.  Laboratory testing was done.

## 2024-08-29 NOTE — HISTORY OF PRESENT ILLNESS
[FreeTextEntry1] : 74 year old Male with PMHx of HTN, HLD, hypothyroidism, prostate cancer s/p prostatectomy 7/2022, gout presents for annual health examination and stress echocardiogram.   Since his last visit remains well overall. He reports occasional episodes of sharp chest pain about once a month for the past year. These episodes occur with walking, they do not present at rest. The pain lasts 1-2 minutes. Worse with deep breathing. Improves with rest or with "holding my breath while I walk." Episodes of chest pain do not occur every time he walks. Last episode occurred 3 weeks ago. He walks 1.5 hours daily. Denies associated dyspnea, presyncope, diaphoresis, syncope.  He experiences infrequent palpitations about once a month. The palpitations are brief and resolve within seconds. Holter monitor in June showed minor arrhythmias. They do not bother him.  He follow up with urologist Dr. Jude Licona. He follows up with gastroenterologist Dr. Eduard Barajas.   At his previous visit, he complained of right thumb numbness. This was evaluated by a neurologist at [facility] and diagnostic workup was not significant. The patient states that he is not concerned and the numbness occurs intermittently. He reports no further episodes of waking up at night gasping for air. He denies recent gout flareup. Denies recent illness. Denies changes in mood. Reports mildly diminished hearing.   Health Maintenance PSA: Labs drawn today Colonoscopy: 2020 WNL; followed by Dr. Barajas Eye: 2022 DERM: 2024 Dentist: 2023 Immunizations: Covid x 4(2023), Influenza(2023), Tetanus ( 2021 ), PNA (1st dose 2018), Shingles (1st dose 2014) =============== Previous workup: Labs (6/2024): H/H 13.2/40.1 Cr1.28 GFR 59 K 4.9  TG 79 HDL 37 LDL 99 A1c 5.8%  Holter (6/2024): mild symptoms, NSR and minor arrhythmia  Carotid doppler US (5/2024):  1. Right: Bifurcation: 20-49% stenosis. 2. Right: proximal ICA 1-19% stenosis. 3. Left: proximal ICA 1-19% stenosis. 4. Vertebral arteries: antegrade flow bilaterally.  CTA (12/2022) Cardiac: 1.  The calcium score is 172 Agatston units, which is at the 69  percentile, adjusted for age, gender and race. 2.  Mild to moderate mid LAD and RI stenosis due to non-calcific plaque 3.  Non-obstructive disease in remaining coronary segments 4.  Based on the above, this study has been submitted for CT FFR  evaluation.  Results will be updated when available. Non-cardiac: 8mm nonsolid nodule within the right lower lobe. As per Fleischner  society 2017 guidelines, in a low risk individual consider a 6-12 months follow-up and in a high risk individual, a 6-12 month surveillance CT chest is suggested.  Echo (7/2022): normal mitral valve, minimal mitral regurgitation, normal trileaflet aortic valve, no aortic regurgitation, normal left ventricular systolic function EF 65% no segmental wall motion abnormalities, normal right ventricular size and function, normal pericardium with no pericardial effusion  Stress test (6/2022): normal

## 2024-08-30 LAB
ALBUMIN SERPL ELPH-MCNC: 4.8 G/DL
ALP BLD-CCNC: 88 U/L
ALT SERPL-CCNC: 16 U/L
ANION GAP SERPL CALC-SCNC: 13 MMOL/L
AST SERPL-CCNC: 18 U/L
BILIRUB DIRECT SERPL-MCNC: 0.2 MG/DL
BILIRUB INDIRECT SERPL-MCNC: 0.4 MG/DL
BILIRUB SERPL-MCNC: 0.6 MG/DL
BUN SERPL-MCNC: 27 MG/DL
CALCIUM SERPL-MCNC: 9.6 MG/DL
CHLORIDE SERPL-SCNC: 104 MMOL/L
CHOLEST SERPL-MCNC: 126 MG/DL
CO2 SERPL-SCNC: 24 MMOL/L
CREAT SERPL-MCNC: 1.39 MG/DL
EGFR: 53 ML/MIN/1.73M2
ESTIMATED AVERAGE GLUCOSE: 114 MG/DL
GLUCOSE SERPL-MCNC: 96 MG/DL
HBA1C MFR BLD HPLC: 5.6 %
HCT VFR BLD CALC: 40.2 %
HDLC SERPL-MCNC: 33 MG/DL
HGB BLD-MCNC: 13.5 G/DL
LDLC SERPL CALC-MCNC: 75 MG/DL
MCHC RBC-ENTMCNC: 31.3 PG
MCHC RBC-ENTMCNC: 33.6 GM/DL
MCV RBC AUTO: 93.1 FL
NONHDLC SERPL-MCNC: 93 MG/DL
PLATELET # BLD AUTO: 201 K/UL
POTASSIUM SERPL-SCNC: 5 MMOL/L
PROT SERPL-MCNC: 7.2 G/DL
PSA SERPL-MCNC: <0.01 NG/ML
RBC # BLD: 4.32 M/UL
RBC # FLD: 13.4 %
SODIUM SERPL-SCNC: 141 MMOL/L
T4 FREE SERPL-MCNC: 1.3 NG/DL
TRIGL SERPL-MCNC: 91 MG/DL
TSH SERPL-ACNC: 2.61 UIU/ML
URATE SERPL-MCNC: 5.1 MG/DL
WBC # FLD AUTO: 9.02 K/UL

## 2024-10-01 ENCOUNTER — APPOINTMENT (OUTPATIENT)
Dept: NEUROLOGY | Facility: CLINIC | Age: 74
End: 2024-10-01

## 2024-10-10 ENCOUNTER — APPOINTMENT (OUTPATIENT)
Dept: UROLOGY | Facility: CLINIC | Age: 74
End: 2024-10-10
Payer: MEDICARE

## 2024-10-10 DIAGNOSIS — R39.9 UNSPECIFIED SYMPTOMS AND SIGNS INVOLVING THE GENITOURINARY SYSTEM: ICD-10-CM

## 2024-10-10 DIAGNOSIS — C64.1 MALIGNANT NEOPLASM OF RIGHT KIDNEY, EXCEPT RENAL PELVIS: ICD-10-CM

## 2024-10-10 DIAGNOSIS — N28.89 OTHER SPECIFIED DISORDERS OF KIDNEY AND URETER: ICD-10-CM

## 2024-10-10 DIAGNOSIS — N20.9 URINARY CALCULUS, UNSPECIFIED: ICD-10-CM

## 2024-10-10 DIAGNOSIS — N52.01 ERECTILE DYSFUNCTION DUE TO ARTERIAL INSUFFICIENCY: ICD-10-CM

## 2024-10-10 DIAGNOSIS — C61 MALIGNANT NEOPLASM OF PROSTATE: ICD-10-CM

## 2024-10-10 PROCEDURE — 99215 OFFICE O/P EST HI 40 MIN: CPT

## 2024-10-10 PROCEDURE — 51798 US URINE CAPACITY MEASURE: CPT

## 2024-10-10 RX ORDER — SILDENAFIL 100 MG/1
100 TABLET, FILM COATED ORAL
Qty: 6 | Refills: 3 | Status: ACTIVE | COMMUNITY
Start: 2024-10-10 | End: 1900-01-01

## 2024-10-11 RX ORDER — SILDENAFIL 100 MG/1
100 TABLET, FILM COATED ORAL
Qty: 6 | Refills: 3 | Status: ACTIVE | COMMUNITY
Start: 2024-10-11 | End: 1900-01-01

## 2024-11-06 ENCOUNTER — RX RENEWAL (OUTPATIENT)
Age: 74
End: 2024-11-06

## 2024-12-02 ENCOUNTER — APPOINTMENT (OUTPATIENT)
Dept: ORTHOPEDIC SURGERY | Facility: CLINIC | Age: 74
End: 2024-12-02

## 2024-12-02 VITALS
BODY MASS INDEX: 24.05 KG/M2 | SYSTOLIC BLOOD PRESSURE: 124 MMHG | HEIGHT: 70 IN | OXYGEN SATURATION: 98 % | TEMPERATURE: 97.2 F | WEIGHT: 168 LBS | HEART RATE: 86 BPM | DIASTOLIC BLOOD PRESSURE: 75 MMHG

## 2024-12-02 DIAGNOSIS — M54.50 LOW BACK PAIN, UNSPECIFIED: ICD-10-CM

## 2024-12-02 PROCEDURE — 99215 OFFICE O/P EST HI 40 MIN: CPT | Mod: 24

## 2024-12-02 PROCEDURE — 72110 X-RAY EXAM L-2 SPINE 4/>VWS: CPT

## 2024-12-03 ENCOUNTER — APPOINTMENT (OUTPATIENT)
Dept: ORTHOPEDIC SURGERY | Age: 74
End: 2024-12-03

## 2024-12-03 DIAGNOSIS — S69.92XD UNSPECIFIED INJURY OF LEFT WRIST, HAND AND FINGER(S), SUBSEQUENT ENCOUNTER: ICD-10-CM

## 2024-12-03 DIAGNOSIS — S59.902D UNSPECIFIED INJURY OF LEFT ELBOW, SUBSEQUENT ENCOUNTER: ICD-10-CM

## 2024-12-03 PROCEDURE — 25630 CLTX CARPL FX W/O MNPJ EA B1: CPT | Mod: LT

## 2024-12-03 PROCEDURE — 73110 X-RAY EXAM OF WRIST: CPT | Mod: LT

## 2024-12-03 PROCEDURE — 73080 X-RAY EXAM OF ELBOW: CPT | Mod: LT

## 2024-12-03 PROCEDURE — 99214 OFFICE O/P EST MOD 30 MIN: CPT | Mod: 57

## 2024-12-03 PROCEDURE — 24650 CLTX RDL HEAD/NCK FX WO MNPJ: CPT | Mod: LT

## 2024-12-19 ENCOUNTER — OUTPATIENT (OUTPATIENT)
Dept: OUTPATIENT SERVICES | Facility: HOSPITAL | Age: 74
LOS: 1 days | End: 2024-12-19
Payer: MEDICARE

## 2024-12-19 ENCOUNTER — APPOINTMENT (OUTPATIENT)
Dept: MRI IMAGING | Facility: HOSPITAL | Age: 74
End: 2024-12-19

## 2024-12-19 PROCEDURE — 72148 MRI LUMBAR SPINE W/O DYE: CPT

## 2024-12-19 PROCEDURE — 72148 MRI LUMBAR SPINE W/O DYE: CPT | Mod: 26,MH

## 2024-12-20 ENCOUNTER — APPOINTMENT (OUTPATIENT)
Dept: HEART AND VASCULAR | Facility: CLINIC | Age: 74
End: 2024-12-20
Payer: MEDICARE

## 2024-12-20 ENCOUNTER — NON-APPOINTMENT (OUTPATIENT)
Age: 74
End: 2024-12-20

## 2024-12-20 VITALS
TEMPERATURE: 97.8 F | SYSTOLIC BLOOD PRESSURE: 133 MMHG | HEIGHT: 70 IN | WEIGHT: 168 LBS | BODY MASS INDEX: 24.05 KG/M2 | OXYGEN SATURATION: 98 % | DIASTOLIC BLOOD PRESSURE: 73 MMHG | HEART RATE: 69 BPM

## 2024-12-20 DIAGNOSIS — E78.00 PURE HYPERCHOLESTEROLEMIA, UNSPECIFIED: ICD-10-CM

## 2024-12-20 DIAGNOSIS — R00.2 PALPITATIONS: ICD-10-CM

## 2024-12-20 DIAGNOSIS — Z87.898 PERSONAL HISTORY OF OTHER SPECIFIED CONDITIONS: ICD-10-CM

## 2024-12-20 DIAGNOSIS — I10 ESSENTIAL (PRIMARY) HYPERTENSION: ICD-10-CM

## 2024-12-20 PROCEDURE — 99213 OFFICE O/P EST LOW 20 MIN: CPT | Mod: 25

## 2024-12-20 PROCEDURE — 93000 ELECTROCARDIOGRAM COMPLETE: CPT

## 2024-12-20 PROCEDURE — 93306 TTE W/DOPPLER COMPLETE: CPT

## 2024-12-20 PROCEDURE — ZZZZZ: CPT | Mod: NC

## 2024-12-31 ENCOUNTER — APPOINTMENT (OUTPATIENT)
Dept: ORTHOPEDIC SURGERY | Facility: CLINIC | Age: 74
End: 2024-12-31
Payer: MEDICARE

## 2024-12-31 DIAGNOSIS — M54.50 LOW BACK PAIN, UNSPECIFIED: ICD-10-CM

## 2024-12-31 PROCEDURE — 99443: CPT | Mod: 93

## 2025-01-08 ENCOUNTER — APPOINTMENT (OUTPATIENT)
Dept: PHYSICAL MEDICINE AND REHAB | Facility: CLINIC | Age: 75
End: 2025-01-08
Payer: MEDICARE

## 2025-01-08 DIAGNOSIS — M43.02 SPONDYLOLYSIS, CERVICAL REGION: ICD-10-CM

## 2025-01-08 DIAGNOSIS — M47.816 SPONDYLOSIS W/OUT MYELOPATHY OR RADICULOPATHY, LUMBAR REGION: ICD-10-CM

## 2025-01-08 DIAGNOSIS — Z86.79 PERSONAL HISTORY OF OTHER DISEASES OF THE CIRCULATORY SYSTEM: ICD-10-CM

## 2025-01-08 PROCEDURE — 99205 OFFICE O/P NEW HI 60 MIN: CPT

## 2025-01-08 RX ORDER — METHOCARBAMOL 750 MG/1
750 TABLET, FILM COATED ORAL EVERY 6 HOURS
Qty: 120 | Refills: 2 | Status: ACTIVE | COMMUNITY
Start: 2025-01-08 | End: 1900-01-01

## 2025-01-09 ENCOUNTER — APPOINTMENT (OUTPATIENT)
Dept: ORTHOPEDIC SURGERY | Age: 75
End: 2025-01-09
Payer: MEDICARE

## 2025-01-09 DIAGNOSIS — S59.902D UNSPECIFIED INJURY OF LEFT ELBOW, SUBSEQUENT ENCOUNTER: ICD-10-CM

## 2025-01-09 DIAGNOSIS — S69.92XD UNSPECIFIED INJURY OF LEFT WRIST, HAND AND FINGER(S), SUBSEQUENT ENCOUNTER: ICD-10-CM

## 2025-01-09 PROCEDURE — 99024 POSTOP FOLLOW-UP VISIT: CPT

## 2025-01-09 PROCEDURE — 73080 X-RAY EXAM OF ELBOW: CPT | Mod: LT

## 2025-01-09 PROCEDURE — 73110 X-RAY EXAM OF WRIST: CPT | Mod: LT

## 2025-01-13 PROBLEM — M43.02 CERVICAL SPONDYLOLYSIS: Status: ACTIVE | Noted: 2025-01-13

## 2025-01-29 ENCOUNTER — RX RENEWAL (OUTPATIENT)
Age: 75
End: 2025-01-29

## 2025-01-30 NOTE — ED PROVIDER NOTE - WR ORDER NAME 1
Pt called back stating that he uses this for his nails ot his eyes. Many years back a dermatologist prescribed this and it is the only thing that heals his nail condition. I believe pt will need new script sent. Please review.    [facility] #[id number] - [place], [place] - [address] [place] [address] AT [facility]    Xray Chest 1 View- PORTABLE-Urgent

## 2025-02-19 ENCOUNTER — APPOINTMENT (OUTPATIENT)
Dept: ORTHOPEDIC SURGERY | Facility: CLINIC | Age: 75
End: 2025-02-19

## 2025-02-21 ENCOUNTER — APPOINTMENT (OUTPATIENT)
Dept: PHYSICAL MEDICINE AND REHAB | Facility: CLINIC | Age: 75
End: 2025-02-21

## 2025-05-08 ENCOUNTER — RX RENEWAL (OUTPATIENT)
Age: 75
End: 2025-05-08

## 2025-06-05 ENCOUNTER — RX RENEWAL (OUTPATIENT)
Age: 75
End: 2025-06-05

## 2025-07-24 ENCOUNTER — NON-APPOINTMENT (OUTPATIENT)
Age: 75
End: 2025-07-24

## 2025-07-24 ENCOUNTER — APPOINTMENT (OUTPATIENT)
Dept: HEART AND VASCULAR | Facility: CLINIC | Age: 75
End: 2025-07-24
Payer: MEDICARE

## 2025-07-24 VITALS
TEMPERATURE: 97.6 F | DIASTOLIC BLOOD PRESSURE: 68 MMHG | WEIGHT: 168 LBS | HEART RATE: 75 BPM | HEIGHT: 61 IN | SYSTOLIC BLOOD PRESSURE: 119 MMHG | OXYGEN SATURATION: 96 % | BODY MASS INDEX: 31.72 KG/M2

## 2025-07-24 DIAGNOSIS — E78.00 PURE HYPERCHOLESTEROLEMIA, UNSPECIFIED: ICD-10-CM

## 2025-07-24 DIAGNOSIS — R07.89 OTHER CHEST PAIN: ICD-10-CM

## 2025-07-24 DIAGNOSIS — I10 ESSENTIAL (PRIMARY) HYPERTENSION: ICD-10-CM

## 2025-07-24 DIAGNOSIS — R94.31 ABNORMAL ELECTROCARDIOGRAM [ECG] [EKG]: ICD-10-CM

## 2025-07-24 DIAGNOSIS — R06.09 OTHER FORMS OF DYSPNEA: ICD-10-CM

## 2025-07-24 PROCEDURE — 93000 ELECTROCARDIOGRAM COMPLETE: CPT

## 2025-07-24 PROCEDURE — 99213 OFFICE O/P EST LOW 20 MIN: CPT | Mod: 25

## 2025-08-11 ENCOUNTER — RX RENEWAL (OUTPATIENT)
Age: 75
End: 2025-08-11